# Patient Record
Sex: FEMALE | Race: WHITE | NOT HISPANIC OR LATINO | Employment: OTHER | URBAN - METROPOLITAN AREA
[De-identification: names, ages, dates, MRNs, and addresses within clinical notes are randomized per-mention and may not be internally consistent; named-entity substitution may affect disease eponyms.]

---

## 2021-08-24 DIAGNOSIS — U07.1 COVID-19: Primary | ICD-10-CM

## 2021-08-25 ENCOUNTER — INFUSION (OUTPATIENT)
Dept: INFECTIOUS DISEASES | Facility: HOSPITAL | Age: 86
End: 2021-08-25
Attending: EMERGENCY MEDICINE
Payer: MEDICARE

## 2021-08-25 VITALS
RESPIRATION RATE: 18 BRPM | SYSTOLIC BLOOD PRESSURE: 178 MMHG | TEMPERATURE: 97 F | HEART RATE: 72 BPM | OXYGEN SATURATION: 99 % | DIASTOLIC BLOOD PRESSURE: 84 MMHG

## 2021-08-25 DIAGNOSIS — U07.1 COVID-19: Primary | ICD-10-CM

## 2021-08-25 PROCEDURE — M0243 CASIRIVI AND IMDEVI INFUSION: HCPCS | Performed by: INTERNAL MEDICINE

## 2021-08-25 PROCEDURE — 25000003 PHARM REV CODE 250: Performed by: INTERNAL MEDICINE

## 2021-08-25 PROCEDURE — 63600175 PHARM REV CODE 636 W HCPCS: Performed by: INTERNAL MEDICINE

## 2021-08-25 RX ORDER — ALBUTEROL SULFATE 90 UG/1
2 AEROSOL, METERED RESPIRATORY (INHALATION)
Status: ACTIVE | OUTPATIENT
Start: 2021-08-25

## 2021-08-25 RX ORDER — SODIUM CHLORIDE 0.9 % (FLUSH) 0.9 %
10 SYRINGE (ML) INJECTION
Status: ACTIVE | OUTPATIENT
Start: 2021-08-25

## 2021-08-25 RX ORDER — DIPHENHYDRAMINE HYDROCHLORIDE 50 MG/ML
25 INJECTION INTRAMUSCULAR; INTRAVENOUS ONCE AS NEEDED
Status: ACTIVE | OUTPATIENT
Start: 2021-08-25 | End: 2033-01-21

## 2021-08-25 RX ORDER — ONDANSETRON 4 MG/1
4 TABLET, ORALLY DISINTEGRATING ORAL ONCE AS NEEDED
Status: ACTIVE | OUTPATIENT
Start: 2021-08-25 | End: 2033-01-21

## 2021-08-25 RX ORDER — ACETAMINOPHEN 325 MG/1
650 TABLET ORAL ONCE AS NEEDED
Status: ACTIVE | OUTPATIENT
Start: 2021-08-25 | End: 2033-01-21

## 2021-08-25 RX ORDER — EPINEPHRINE 0.3 MG/.3ML
0.3 INJECTION SUBCUTANEOUS
Status: ACTIVE | OUTPATIENT
Start: 2021-08-25

## 2021-08-25 RX ADMIN — CASIRIVIMAB AND IMDEVIMAB 600 MG: 600; 600 INJECTION, SOLUTION, CONCENTRATE INTRAVENOUS at 11:08

## 2023-06-28 ENCOUNTER — HOSPITAL ENCOUNTER (OUTPATIENT)
Facility: HOSPITAL | Age: 88
Discharge: HOME | End: 2023-06-29
Attending: EMERGENCY MEDICINE | Admitting: INTERNAL MEDICINE
Payer: MEDICARE

## 2023-06-28 DIAGNOSIS — E87.6 LOW BLOOD POTASSIUM: ICD-10-CM

## 2023-06-28 DIAGNOSIS — U07.1 COVID: Primary | ICD-10-CM

## 2023-06-28 DIAGNOSIS — D64.9 ANEMIA, UNSPECIFIED TYPE: ICD-10-CM

## 2023-06-28 DIAGNOSIS — R55 SYNCOPE: ICD-10-CM

## 2023-06-28 DIAGNOSIS — R55 SYNCOPE, UNSPECIFIED SYNCOPE TYPE: ICD-10-CM

## 2023-06-28 DIAGNOSIS — R07.9 CHEST PAIN: ICD-10-CM

## 2023-06-28 DIAGNOSIS — R55 NEAR SYNCOPE: ICD-10-CM

## 2023-06-28 DIAGNOSIS — R79.0 LOW MAGNESIUM LEVEL: ICD-10-CM

## 2023-06-28 PROBLEM — I10 PRIMARY HYPERTENSION: Status: ACTIVE | Noted: 2023-06-28

## 2023-06-28 PROBLEM — E83.42 HYPOMAGNESEMIA: Status: ACTIVE | Noted: 2023-06-28

## 2023-06-28 PROBLEM — I49.9 ARRHYTHMIA: Status: ACTIVE | Noted: 2023-06-28

## 2023-06-28 LAB
ALBUMIN SERPL BCP-MCNC: 2.8 G/DL (ref 3.5–5.2)
ALP SERPL-CCNC: 41 U/L (ref 55–135)
ALT SERPL W/O P-5'-P-CCNC: 7 U/L (ref 10–44)
ANION GAP SERPL CALC-SCNC: 9 MMOL/L (ref 8–16)
AST SERPL-CCNC: 16 U/L (ref 10–40)
BASOPHILS # BLD AUTO: 0.02 K/UL (ref 0–0.2)
BASOPHILS NFR BLD: 0.5 % (ref 0–1.9)
BILIRUB SERPL-MCNC: 0.7 MG/DL (ref 0.1–1)
BILIRUB UR QL STRIP: NEGATIVE
BNP SERPL-MCNC: 80 PG/ML (ref 0–99)
BUN SERPL-MCNC: 11 MG/DL (ref 8–23)
CALCIUM SERPL-MCNC: 6.6 MG/DL (ref 8.7–10.5)
CHLORIDE SERPL-SCNC: 112 MMOL/L (ref 95–110)
CK SERPL-CCNC: 66 U/L (ref 20–180)
CLARITY UR: CLEAR
CO2 SERPL-SCNC: 19 MMOL/L (ref 23–29)
COLOR UR: YELLOW
CREAT SERPL-MCNC: 0.6 MG/DL (ref 0.5–1.4)
CRP SERPL-MCNC: 12.2 MG/L (ref 0–8.2)
D DIMER PPP IA.FEU-MCNC: 0.83 MG/L FEU
DIFFERENTIAL METHOD: ABNORMAL
EOSINOPHIL # BLD AUTO: 0 K/UL (ref 0–0.5)
EOSINOPHIL NFR BLD: 0.2 % (ref 0–8)
ERYTHROCYTE [DISTWIDTH] IN BLOOD BY AUTOMATED COUNT: 12.4 % (ref 11.5–14.5)
EST. GFR  (NO RACE VARIABLE): >60 ML/MIN/1.73 M^2
FERRITIN SERPL-MCNC: 119 NG/ML (ref 20–300)
GLUCOSE SERPL-MCNC: 79 MG/DL (ref 70–110)
GLUCOSE UR QL STRIP: NEGATIVE
HCT VFR BLD AUTO: 28 % (ref 37–48.5)
HGB BLD-MCNC: 9.4 G/DL (ref 12–16)
HGB UR QL STRIP: NEGATIVE
IMM GRANULOCYTES # BLD AUTO: 0.01 K/UL (ref 0–0.04)
IMM GRANULOCYTES NFR BLD AUTO: 0.2 % (ref 0–0.5)
KETONES UR QL STRIP: NEGATIVE
LACTATE SERPL-SCNC: 1 MMOL/L (ref 0.5–2.2)
LDH SERPL L TO P-CCNC: 214 U/L (ref 110–260)
LEUKOCYTE ESTERASE UR QL STRIP: NEGATIVE
LYMPHOCYTES # BLD AUTO: 1 K/UL (ref 1–4.8)
LYMPHOCYTES NFR BLD: 24.2 % (ref 18–48)
MAGNESIUM SERPL-MCNC: 1.3 MG/DL (ref 1.6–2.6)
MCH RBC QN AUTO: 31.6 PG (ref 27–31)
MCHC RBC AUTO-ENTMCNC: 33.6 G/DL (ref 32–36)
MCV RBC AUTO: 94 FL (ref 82–98)
MONOCYTES # BLD AUTO: 0.6 K/UL (ref 0.3–1)
MONOCYTES NFR BLD: 14.9 % (ref 4–15)
NEUTROPHILS # BLD AUTO: 2.6 K/UL (ref 1.8–7.7)
NEUTROPHILS NFR BLD: 60 % (ref 38–73)
NITRITE UR QL STRIP: NEGATIVE
NRBC BLD-RTO: 0 /100 WBC
PH UR STRIP: 7 [PH] (ref 5–8)
PLATELET # BLD AUTO: 137 K/UL (ref 150–450)
PLATELET BLD QL SMEAR: ABNORMAL
PMV BLD AUTO: 9.9 FL (ref 9.2–12.9)
POTASSIUM SERPL-SCNC: 2.9 MMOL/L (ref 3.5–5.1)
PROCALCITONIN SERPL IA-MCNC: 0.02 NG/ML
PROT SERPL-MCNC: 5.1 G/DL (ref 6–8.4)
PROT UR QL STRIP: NEGATIVE
RBC # BLD AUTO: 2.97 M/UL (ref 4–5.4)
SARS-COV-2 RDRP RESP QL NAA+PROBE: POSITIVE
SODIUM SERPL-SCNC: 140 MMOL/L (ref 136–145)
SP GR UR STRIP: 1.01 (ref 1–1.03)
TROPONIN I SERPL DL<=0.01 NG/ML-MCNC: 0.02 NG/ML (ref 0–0.03)
TROPONIN I SERPL DL<=0.01 NG/ML-MCNC: <0.006 NG/ML (ref 0–0.03)
URN SPEC COLLECT METH UR: NORMAL
UROBILINOGEN UR STRIP-ACNC: NEGATIVE EU/DL
WBC # BLD AUTO: 4.29 K/UL (ref 3.9–12.7)

## 2023-06-28 PROCEDURE — 36415 COLL VENOUS BLD VENIPUNCTURE: CPT | Performed by: INTERNAL MEDICINE

## 2023-06-28 PROCEDURE — 96365 THER/PROPH/DIAG IV INF INIT: CPT

## 2023-06-28 PROCEDURE — 99291 CRITICAL CARE FIRST HOUR: CPT | Mod: 25

## 2023-06-28 PROCEDURE — 96366 THER/PROPH/DIAG IV INF ADDON: CPT

## 2023-06-28 PROCEDURE — 96368 THER/DIAG CONCURRENT INF: CPT

## 2023-06-28 PROCEDURE — 25000003 PHARM REV CODE 250: Performed by: INTERNAL MEDICINE

## 2023-06-28 PROCEDURE — 85379 FIBRIN DEGRADATION QUANT: CPT | Performed by: EMERGENCY MEDICINE

## 2023-06-28 PROCEDURE — 82550 ASSAY OF CK (CPK): CPT | Performed by: EMERGENCY MEDICINE

## 2023-06-28 PROCEDURE — G0378 HOSPITAL OBSERVATION PER HR: HCPCS

## 2023-06-28 PROCEDURE — 84484 ASSAY OF TROPONIN QUANT: CPT | Performed by: EMERGENCY MEDICINE

## 2023-06-28 PROCEDURE — 83880 ASSAY OF NATRIURETIC PEPTIDE: CPT | Performed by: EMERGENCY MEDICINE

## 2023-06-28 PROCEDURE — 93010 ELECTROCARDIOGRAM REPORT: CPT | Mod: ,,, | Performed by: INTERNAL MEDICINE

## 2023-06-28 PROCEDURE — 86140 C-REACTIVE PROTEIN: CPT | Performed by: EMERGENCY MEDICINE

## 2023-06-28 PROCEDURE — 81003 URINALYSIS AUTO W/O SCOPE: CPT | Performed by: INTERNAL MEDICINE

## 2023-06-28 PROCEDURE — 84484 ASSAY OF TROPONIN QUANT: CPT | Mod: 91 | Performed by: INTERNAL MEDICINE

## 2023-06-28 PROCEDURE — 82728 ASSAY OF FERRITIN: CPT | Performed by: EMERGENCY MEDICINE

## 2023-06-28 PROCEDURE — 25000003 PHARM REV CODE 250: Performed by: EMERGENCY MEDICINE

## 2023-06-28 PROCEDURE — U0002 COVID-19 LAB TEST NON-CDC: HCPCS | Performed by: EMERGENCY MEDICINE

## 2023-06-28 PROCEDURE — 80053 COMPREHEN METABOLIC PANEL: CPT | Performed by: EMERGENCY MEDICINE

## 2023-06-28 PROCEDURE — 83615 LACTATE (LD) (LDH) ENZYME: CPT | Performed by: EMERGENCY MEDICINE

## 2023-06-28 PROCEDURE — 84145 PROCALCITONIN (PCT): CPT | Performed by: EMERGENCY MEDICINE

## 2023-06-28 PROCEDURE — 96372 THER/PROPH/DIAG INJ SC/IM: CPT | Mod: 59 | Performed by: INTERNAL MEDICINE

## 2023-06-28 PROCEDURE — 93005 ELECTROCARDIOGRAM TRACING: CPT

## 2023-06-28 PROCEDURE — 93010 EKG 12-LEAD: ICD-10-PCS | Mod: ,,, | Performed by: INTERNAL MEDICINE

## 2023-06-28 PROCEDURE — 83735 ASSAY OF MAGNESIUM: CPT | Performed by: EMERGENCY MEDICINE

## 2023-06-28 PROCEDURE — 85025 COMPLETE CBC W/AUTO DIFF WBC: CPT | Performed by: EMERGENCY MEDICINE

## 2023-06-28 PROCEDURE — 83605 ASSAY OF LACTIC ACID: CPT | Performed by: EMERGENCY MEDICINE

## 2023-06-28 PROCEDURE — 96375 TX/PRO/DX INJ NEW DRUG ADDON: CPT

## 2023-06-28 PROCEDURE — 63600175 PHARM REV CODE 636 W HCPCS: Performed by: INTERNAL MEDICINE

## 2023-06-28 PROCEDURE — 63600175 PHARM REV CODE 636 W HCPCS: Performed by: EMERGENCY MEDICINE

## 2023-06-28 RX ORDER — ONDANSETRON 2 MG/ML
4 INJECTION INTRAMUSCULAR; INTRAVENOUS EVERY 6 HOURS PRN
Status: DISCONTINUED | OUTPATIENT
Start: 2023-06-28 | End: 2023-06-29 | Stop reason: HOSPADM

## 2023-06-28 RX ORDER — LOSARTAN POTASSIUM 50 MG/1
100 TABLET ORAL DAILY
Status: DISCONTINUED | OUTPATIENT
Start: 2023-06-29 | End: 2023-06-29 | Stop reason: HOSPADM

## 2023-06-28 RX ORDER — NALOXONE HCL 0.4 MG/ML
0.4 VIAL (ML) INJECTION
Status: DISCONTINUED | OUTPATIENT
Start: 2023-06-28 | End: 2023-06-29 | Stop reason: HOSPADM

## 2023-06-28 RX ORDER — IBUPROFEN 200 MG
24 TABLET ORAL
Status: DISCONTINUED | OUTPATIENT
Start: 2023-06-28 | End: 2023-06-29 | Stop reason: HOSPADM

## 2023-06-28 RX ORDER — ASPIRIN 81 MG/1
81 TABLET ORAL DAILY
Status: DISCONTINUED | OUTPATIENT
Start: 2023-06-29 | End: 2023-06-29 | Stop reason: HOSPADM

## 2023-06-28 RX ORDER — HYDROCHLOROTHIAZIDE 12.5 MG/1
25 TABLET ORAL DAILY
COMMUNITY
End: 2024-02-22 | Stop reason: SDUPTHER

## 2023-06-28 RX ORDER — POTASSIUM CHLORIDE 20 MEQ/1
40 TABLET, EXTENDED RELEASE ORAL ONCE
Status: COMPLETED | OUTPATIENT
Start: 2023-06-28 | End: 2023-06-28

## 2023-06-28 RX ORDER — TELMISARTAN 40 MG/1
40 TABLET ORAL
COMMUNITY
Start: 2023-06-03

## 2023-06-28 RX ORDER — FLUTICASONE PROPIONATE 50 UG/1
POWDER, METERED RESPIRATORY (INHALATION)
COMMUNITY

## 2023-06-28 RX ORDER — IBUPROFEN 200 MG
16 TABLET ORAL
Status: DISCONTINUED | OUTPATIENT
Start: 2023-06-28 | End: 2023-06-29 | Stop reason: HOSPADM

## 2023-06-28 RX ORDER — CARVEDILOL 12.5 MG/1
12.5 TABLET ORAL 2 TIMES DAILY
COMMUNITY
Start: 2023-05-08 | End: 2023-08-16 | Stop reason: SDUPTHER

## 2023-06-28 RX ORDER — ASPIRIN 81 MG/1
81 TABLET ORAL
COMMUNITY

## 2023-06-28 RX ORDER — SODIUM CHLORIDE 0.9 % (FLUSH) 0.9 %
10 SYRINGE (ML) INJECTION EVERY 12 HOURS PRN
Status: DISCONTINUED | OUTPATIENT
Start: 2023-06-28 | End: 2023-06-29 | Stop reason: HOSPADM

## 2023-06-28 RX ORDER — MAGNESIUM SULFATE HEPTAHYDRATE 40 MG/ML
2 INJECTION, SOLUTION INTRAVENOUS ONCE
Status: COMPLETED | OUTPATIENT
Start: 2023-06-28 | End: 2023-06-28

## 2023-06-28 RX ORDER — MAG HYDROX/ALUMINUM HYD/SIMETH 200-200-20
30 SUSPENSION, ORAL (FINAL DOSE FORM) ORAL 4 TIMES DAILY PRN
Status: DISCONTINUED | OUTPATIENT
Start: 2023-06-28 | End: 2023-06-29 | Stop reason: HOSPADM

## 2023-06-28 RX ORDER — ENOXAPARIN SODIUM 100 MG/ML
1 INJECTION SUBCUTANEOUS EVERY 12 HOURS
Status: DISCONTINUED | OUTPATIENT
Start: 2023-06-28 | End: 2023-06-29 | Stop reason: HOSPADM

## 2023-06-28 RX ORDER — ALBUTEROL SULFATE 90 UG/1
2 AEROSOL, METERED RESPIRATORY (INHALATION) EVERY 4 HOURS PRN
Status: DISCONTINUED | OUTPATIENT
Start: 2023-06-28 | End: 2023-06-29 | Stop reason: HOSPADM

## 2023-06-28 RX ORDER — SIMETHICONE 80 MG
1 TABLET,CHEWABLE ORAL 4 TIMES DAILY PRN
Status: DISCONTINUED | OUTPATIENT
Start: 2023-06-28 | End: 2023-06-29 | Stop reason: HOSPADM

## 2023-06-28 RX ORDER — HYDRALAZINE HYDROCHLORIDE 20 MG/ML
10 INJECTION INTRAMUSCULAR; INTRAVENOUS EVERY 6 HOURS PRN
Status: DISCONTINUED | OUTPATIENT
Start: 2023-06-28 | End: 2023-06-29 | Stop reason: HOSPADM

## 2023-06-28 RX ORDER — GLUCAGON 1 MG
1 KIT INJECTION
Status: DISCONTINUED | OUTPATIENT
Start: 2023-06-28 | End: 2023-06-29 | Stop reason: HOSPADM

## 2023-06-28 RX ORDER — ACETAMINOPHEN 325 MG/1
650 TABLET ORAL EVERY 4 HOURS PRN
Status: DISCONTINUED | OUTPATIENT
Start: 2023-06-28 | End: 2023-06-29 | Stop reason: HOSPADM

## 2023-06-28 RX ORDER — POTASSIUM CHLORIDE 7.45 MG/ML
10 INJECTION INTRAVENOUS
Status: COMPLETED | OUTPATIENT
Start: 2023-06-28 | End: 2023-06-28

## 2023-06-28 RX ADMIN — POTASSIUM CHLORIDE 10 MEQ: 7.46 INJECTION, SOLUTION INTRAVENOUS at 05:06

## 2023-06-28 RX ADMIN — HYDRALAZINE HYDROCHLORIDE 10 MG: 20 INJECTION, SOLUTION INTRAMUSCULAR; INTRAVENOUS at 07:06

## 2023-06-28 RX ADMIN — DEXAMETHASONE 6 MG: 4 TABLET ORAL at 07:06

## 2023-06-28 RX ADMIN — POTASSIUM CHLORIDE 40 MEQ: 1500 TABLET, EXTENDED RELEASE ORAL at 07:06

## 2023-06-28 RX ADMIN — MAGNESIUM SULFATE HEPTAHYDRATE 2 G: 40 INJECTION, SOLUTION INTRAVENOUS at 05:06

## 2023-06-28 RX ADMIN — ENOXAPARIN SODIUM 60 MG: 60 INJECTION SUBCUTANEOUS at 09:06

## 2023-06-28 RX ADMIN — SODIUM CHLORIDE 500 ML: 9 INJECTION, SOLUTION INTRAVENOUS at 05:06

## 2023-06-28 NOTE — ED PROVIDER NOTES
Emergency Medicine Provider Note - 6/28/2023    SCRIBE #1 NOTE: I, Melanie Clarke, am scribing for, and in the presence of,  Sofia Lopes DO. I have scribed the entire note.      History     Chief Complaint   Patient presents with    Loss of Consciousness     Pt reports multiple syncopal episodes x 2 in the past two days. Pt tested COVID positive yesterday. No reported pain          History of Present Illness   HPI    6/28/2023, 12:46 PM    The history is provided by the patient and RO Keys is a 88 y.o. female presenting to the ED for an evaluation because she had a 5-minute episode of LOC PTA. Per RO, yesterday, the pt had a syncopal episode and was dx with COVID-19. Pt hit her head during the syncopal episode. Then, today, the pt's family noticed that the pt lost consciousness for 5 minutes in her recliner after she had phenergan cough syrup. When RO arrived on scene, the pt's blood pressure was in the 150s systolic. Symptoms are episodic and moderate in severity. No mitigating or exacerbating factors reported. No associated sxs reported. Patient denies any fever, chills, CP, SOB, cough, and all other sxs at this time.    Patient's daughter Jani are at the bedside.  They state that the patient was in a recliner sitting.  They state about 15-20 minutes after taking a tsp of Phenergan without codeine, patient started having decreasing levels of consciousness.  She appeared pale.  The daughter was in able to feel a pulse.  They called 911.  They lower the head of the recliner.  They attempted to put a blood pressure cuff on her.  The automatic blood pressure cuff would not read.  It took multiple attempts before reading could be obtained.  They noticed systolic blood pressure of 70s.  No seizure activity.  They note that a similar episode happened the previous day.  She was standing at the sink cutting pairs.  She had put her head down.  A family member then helped her sit down.    .    Patient states that she has had the urge to have bowel movements.  They are not exactly loose, but not solid either.  She reports waxing and waning nausea. No further complaints or concerns at this time.         Arrival mode:  AASI      PCP: Primary Doctor No     Allergies:  Review of patient's allergies indicates:   Allergen Reactions    Iodine and iodide containing products Itching and Swelling       Past Medical History:  Past Medical History:   Diagnosis Date    Arthritis     Hypertension        Past Surgical History:  Past Surgical History:   Procedure Laterality Date    APPENDECTOMY      COLPOSCOPY, WITH BIOPSY OF CERVIX      EYE SURGERY           Family History:  Family History   Problem Relation Age of Onset    Heart disease Brother        Social History:  Social History     Tobacco Use    Smoking status: Never    Smokeless tobacco: Never   Substance and Sexual Activity    Alcohol use: Not on file    Drug use: Not on file    Sexual activity: Not on file       Triage note, Allergies, Past Medical History, Past Surgical History, Family History and Social History reviewed as documented above.     Review of Systems   Review of Systems   Constitutional:  Negative for chills and fever.   HENT:  Negative for sore throat.    Respiratory:  Negative for cough and shortness of breath.    Cardiovascular:  Negative for chest pain.   Gastrointestinal:  Positive for nausea. Negative for vomiting.   Genitourinary:  Negative for dysuria.   Musculoskeletal:  Negative for back pain.   Skin:  Positive for pallor. Negative for rash.   Neurological:  Positive for syncope.   Hematological:  Does not bruise/bleed easily.   All other systems reviewed and are negative.       Physical Exam     Initial Vitals [06/28/23 1228]   BP Pulse Resp Temp SpO2   127/65 67 16 98 °F (36.7 °C) 98 %      MAP       --          Physical Exam    Nursing Notes and Vital Signs Reviewed.  Constitutional: Patient is in no acute distress.  Thin  appearing  Head: Atraumatic. Normocephalic.  Eyes: PERRL. EOM intact. Conjunctivae are not pale. No scleral icterus.  ENT: Mucous membranes are moist. Oropharynx is clear and symmetric.    Neck: Supple. Full ROM. No lymphadenopathy.  Cardiovascular: Regular rate. Regular rhythm. No murmurs, rubs, or gallops. Distal pulses are 2+ and symmetric.  Pulmonary/Chest: No respiratory distress. Clear to auscultation bilaterally. No wheezing or rales.  Abdominal: Soft and non-distended.  There is no tenderness.  No rebound, guarding, or rigidity.   Musculoskeletal: Moves all extremities. No obvious deformities. No edema.  Skin: Warm and dry.  Neurological:  Alert, awake, and appropriate.  Normal speech. Cranial nerves II-XII intact. No acute focal neurological deficits are appreciated.  GCS 15.   Psychiatric: Normal affect. Good eye contact. Appropriate in content.     ED Course     ED Procedures:  Critical Care    Date/Time: 6/28/2023 12:46 PM  Performed by: Sofia Lopes DO  Authorized by: Sofia Lopes DO   Direct patient critical care time: 15 minutes  Additional history critical care time: 7 minutes  Ordering / reviewing critical care time: 6 minutes  Documentation critical care time: 7 minutes  Consulting other physicians critical care time: 9 minutes  Total critical care time (exclusive of procedural time) : 44 minutes  Critical care time was exclusive of separately billable procedures and treating other patients and teaching time.  Critical care was necessary to treat or prevent imminent or life-threatening deterioration of the following conditions: dehydration (electrolyte abnormality).  Critical care was time spent personally by me on the following activities: blood draw for specimens, development of treatment plan with patient or surrogate, discussions with consultants, interpretation of cardiac output measurements, evaluation of patient's response to treatment, examination of patient, obtaining history  "from patient or surrogate, ordering and performing treatments and interventions, ordering and review of laboratory studies, ordering and review of radiographic studies, review of old charts, re-evaluation of patient's condition and pulse oximetry.        ED Vital Signs:  Vitals:    06/28/23 1228 06/28/23 1304 06/28/23 1440 06/28/23 1442   BP: 127/65  (!) 148/79 (!) 167/72   Pulse: 67  60 64   Resp: 16      Temp: 98 °F (36.7 °C)      TempSrc: Oral      SpO2: 98%      Weight:  64.7 kg (142 lb 10.2 oz)     Height: 5' 1" (1.549 m)       06/28/23 1444 06/28/23 1933 06/28/23 1942 06/28/23 2000   BP: (!) 160/77 (!) 188/83  (!) 179/79   Pulse: 62  63 68   Resp:    (!) 23   Temp:       TempSrc:       SpO2:    98%   Weight:       Height:        06/28/23 2100   BP: (!) 169/83   Pulse: 68   Resp: (!) 23   Temp:    TempSrc:    SpO2: 99%   Weight:    Height:        Abnormal Lab Results:  Labs Reviewed   SARS-COV-2 RNA AMPLIFICATION, QUAL - Abnormal; Notable for the following components:       Result Value    SARS-CoV-2 RNA, Amplification, Qual Positive (*)     All other components within normal limits   CBC W/ AUTO DIFFERENTIAL - Abnormal; Notable for the following components:    RBC 2.97 (*)     Hemoglobin 9.4 (*)     Hematocrit 28.0 (*)     MCH 31.6 (*)     Platelets 137 (*)     Platelet Estimate Clumped (*)     All other components within normal limits   COMPREHENSIVE METABOLIC PANEL - Abnormal; Notable for the following components:    Potassium 2.9 (*)     Chloride 112 (*)     CO2 19 (*)     Calcium 6.6 (*)     Total Protein 5.1 (*)     Albumin 2.8 (*)     Alkaline Phosphatase 41 (*)     ALT 7 (*)     All other components within normal limits    Narrative:     ca critical result(s) called and verbal readback obtained from najma childs rn by JCS5 06/28/2023 15:09   C-REACTIVE PROTEIN - Abnormal; Notable for the following components:    CRP 12.2 (*)     All other components within normal limits   D DIMER, QUANTITATIVE - " Abnormal; Notable for the following components:    D-Dimer 0.83 (*)     All other components within normal limits   MAGNESIUM - Abnormal; Notable for the following components:    Magnesium 1.3 (*)     All other components within normal limits   FERRITIN   LACTATE DEHYDROGENASE   CK   LACTIC ACID, PLASMA   TROPONIN I   PROCALCITONIN   B-TYPE NATRIURETIC PEPTIDE   MAGNESIUM   URINALYSIS, REFLEX TO URINE CULTURE    Narrative:     Specimen Source->Urine   TROPONIN I   TROPONIN I   SARS-COV-2 RDRP GENE    [x] abnormal labs reviewed    All Lab Results:  Results for orders placed or performed during the hospital encounter of 06/28/23   COVID-19 Rapid Screening   Result Value Ref Range    SARS-CoV-2 RNA, Amplification, Qual Positive (A) Negative   CBC auto differential   Result Value Ref Range    WBC 4.29 3.90 - 12.70 K/uL    RBC 2.97 (L) 4.00 - 5.40 M/uL    Hemoglobin 9.4 (L) 12.0 - 16.0 g/dL    Hematocrit 28.0 (L) 37.0 - 48.5 %    MCV 94 82 - 98 fL    MCH 31.6 (H) 27.0 - 31.0 pg    MCHC 33.6 32.0 - 36.0 g/dL    RDW 12.4 11.5 - 14.5 %    Platelets 137 (L) 150 - 450 K/uL    MPV 9.9 9.2 - 12.9 fL    Immature Granulocytes 0.2 0.0 - 0.5 %    Gran # (ANC) 2.6 1.8 - 7.7 K/uL    Immature Grans (Abs) 0.01 0.00 - 0.04 K/uL    Lymph # 1.0 1.0 - 4.8 K/uL    Mono # 0.6 0.3 - 1.0 K/uL    Eos # 0.0 0.0 - 0.5 K/uL    Baso # 0.02 0.00 - 0.20 K/uL    nRBC 0 0 /100 WBC    Gran % 60.0 38.0 - 73.0 %    Lymph % 24.2 18.0 - 48.0 %    Mono % 14.9 4.0 - 15.0 %    Eosinophil % 0.2 0.0 - 8.0 %    Basophil % 0.5 0.0 - 1.9 %    Platelet Estimate Clumped (A)     Differential Method Automated    Comprehensive metabolic panel   Result Value Ref Range    Sodium 140 136 - 145 mmol/L    Potassium 2.9 (L) 3.5 - 5.1 mmol/L    Chloride 112 (H) 95 - 110 mmol/L    CO2 19 (L) 23 - 29 mmol/L    Glucose 79 70 - 110 mg/dL    BUN 11 8 - 23 mg/dL    Creatinine 0.6 0.5 - 1.4 mg/dL    Calcium 6.6 (LL) 8.7 - 10.5 mg/dL    Total Protein 5.1 (L) 6.0 - 8.4 g/dL    Albumin  2.8 (L) 3.5 - 5.2 g/dL    Total Bilirubin 0.7 0.1 - 1.0 mg/dL    Alkaline Phosphatase 41 (L) 55 - 135 U/L    AST 16 10 - 40 U/L    ALT 7 (L) 10 - 44 U/L    eGFR >60 >60 mL/min/1.73 m^2    Anion Gap 9 8 - 16 mmol/L   C-Reactive Protein   Result Value Ref Range    CRP 12.2 (H) 0.0 - 8.2 mg/L   Ferritin   Result Value Ref Range    Ferritin 119 20.0 - 300.0 ng/mL   Lactate Dehydrogenase   Result Value Ref Range     110 - 260 U/L   CK   Result Value Ref Range    CPK 66 20 - 180 U/L   Lactic Acid, Plasma   Result Value Ref Range    Lactate (Lactic Acid) 1.0 0.5 - 2.2 mmol/L   Troponin I   Result Value Ref Range    Troponin I <0.006 0.000 - 0.026 ng/mL   Procalcitonin   Result Value Ref Range    Procalcitonin 0.02 <0.25 ng/mL   Brain Natriuretic Peptide   Result Value Ref Range    BNP 80 0 - 99 pg/mL   D-Dimer, Quantitative   Result Value Ref Range    D-Dimer 0.83 (H) <0.50 mg/L FEU   Magnesium   Result Value Ref Range    Magnesium 1.3 (L) 1.6 - 2.6 mg/dL   Urinalysis, Reflex to Urine Culture Urine, Clean Catch    Specimen: Urine   Result Value Ref Range    Specimen UA Urine, Clean Catch     Color, UA Yellow Yellow, Straw, Anamika    Appearance, UA Clear Clear    pH, UA 7.0 5.0 - 8.0    Specific Gravity, UA 1.015 1.005 - 1.030    Protein, UA Negative Negative    Glucose, UA Negative Negative    Ketones, UA Negative Negative    Bilirubin (UA) Negative Negative    Occult Blood UA Negative Negative    Nitrite, UA Negative Negative    Urobilinogen, UA Negative <2.0 EU/dL    Leukocytes, UA Negative Negative   Troponin I   Result Value Ref Range    Troponin I 0.018 0.000 - 0.026 ng/mL       4 months ago.  Hemoglobin 12.0 - 16.0 g/dL 12.7    Hematocrit 37.0 - 47.0 % 39.3            ECG Results              EKG 12-lead (Final result)  Result time 06/28/23 20:35:14      Final result by Interface, Lab In [facility] (06/28/23 20:35:14)                   Narrative:    Test Reason : R55,    Vent. Rate : 059 BPM     Atrial Rate : 059  BPM     P-R Int : 284 ms          QRS Dur : 110 ms      QT Int : 458 ms       P-R-T Axes : 070 -53 086 degrees     QTc Int : 453 ms    Sinus bradycardia with 1st degree A-V block  Left axis deviation  Minimal voltage criteria for LVH, may be normal variant ( Hurricane product )  Inferior infarct ,age undetermined  Anterolateral infarct ,age undetermined  Abnormal ECG  No previous ECGs available  Confirmed by LISA FRENCH MD ([id number]) on 6/28/2023 8:35:03 PM    Referred By: [email or web address]   SELF           Confirmed By:LISA FRENCH MD                      Wet Read by Sofia Lopes DO (06/28/23 14:48:17, [facility] Emergency Dept[facility] Emergency Medicine)    Rate of 59 beats per minute.  Sinus bradycardia.  First-degree AV block.  Left axis deviation.  No ST segment elevation.  Q-waves in V1, V2, V3.  No old EKGs to compare to.                                    Imaging Results:  Imaging Results              US Lower Extremity Veins Bilateral (Final result)  Result time 06/28/23 17:27:46      Final result by Freddy Crump MD (06/28/23 17:27:46)                   Impression:      No evidence of deep venous thrombosis in either lower extremity.      Electronically signed by: Freddy Crump  Date:    06/28/2023  Time:    17:27               Narrative:    EXAMINATION:  US LOWER EXTREMITY VEINS BILATERAL    CLINICAL HISTORY:  rule out DVT;    TECHNIQUE:  Duplex and color flow Doppler and dynamic compression was performed of the bilateral lower extremity veins was performed.    COMPARISON:  None    FINDINGS:  Right thigh veins: The common femoral, femoral, popliteal, upper greater saphenous, and deep femoral veins are patent and free of thrombus. The veins are normally compressible and have normal phasic flow and augmentation response.    Right calf veins: The visualized calf veins are patent.    Left thigh veins: The common femoral, femoral, popliteal, upper greater saphenous, and deep femoral veins are patent and free of thrombus.  The veins are normally compressible and have normal phasic flow and augmentation response.    Left calf veins: The visualized calf veins are patent.    Miscellaneous: None                                       CT Head Without Contrast (Final result)  Result time 06/28/23 17:03:03      Final result by Freddy Crump MD (06/28/23 17:03:03)                   Impression:      No acute intracranial CT abnormality.  Correlation and further evaluation as warranted.    All CT scans at this facility are performed  using dose modulation techniques as appropriate to performed exam including the following:  automated exposure control; adjustment of mA and/or kV according to the patients size (this includes techniques or standardized protocols for targeted exams where dose is matched to indication/reason for exam: i.e. extremities or head);  iterative reconstruction technique.      Electronically signed by: Freddy Crump  Date:    06/28/2023  Time:    17:03               Narrative:    EXAMINATION:  CT HEAD WITHOUT CONTRAST    CLINICAL HISTORY:  Syncope, recurrent; Syncope and collapse    TECHNIQUE:  Low dose axial CT images obtained throughout the head without intravenous contrast. Sagittal and coronal reconstructions were performed.    COMPARISON:  None.    FINDINGS:  Intracranial compartment:    Ventricles and sulci are normal in size for age without evidence of hydrocephalus. No extra-axial blood or fluid collections.    Moderate microvascular ischemic change.  No parenchymal mass, hemorrhage, edema or major vascular distribution infarct.    Skull/extracranial contents (limited evaluation): No fracture. Mastoid air cells and paranasal sinuses are essentially clear.  Temporomandibular joint degenerative changes.                                       X-Ray Chest AP Portable (Final result)  Result time 06/28/23 14:04:45      Final result by ARIS Banda Sr., MD (06/28/23 14:04:45)                   Impression:      1.  There is no focal pulmonary infiltrate visualized.  2. There are sclerotic changes in the posterior aspect of the left 5th and 6th ribs.  This is characteristic of healing and/or healed fractures.  3. The size of the heart is prominent.  This may be secondary to magnification.  .      Electronically signed by: Thomas Banda MD  Date:    06/28/2023  Time:    14:04               Narrative:    EXAMINATION:  XR CHEST AP PORTABLE    CLINICAL HISTORY:  COVID-19;    COMPARISON:  None    FINDINGS:  The size of the heart is prominent.  There is no focal pulmonary infiltrate visualized.  There are sclerotic changes in the posterior aspect of the left 5th and 6th ribs.  There is no pneumothorax.  The costophrenic angles are sharp.                                            The Emergency Provider reviewed the vital signs and test results, which are outlined above.     ED Discussion     ED Course as of 06/28/23 2215 Wed Jun 28, 2023   1415 Independent review of chest x-ray:  Heart size is prominent.  No infiltrate. [LB]   1514 Potassium(!): 2.9 [LB]   1514 Chloride(!): 112 [LB]   1514 CO2(!): 19 [LB]   1514 Calcium(!!): 6.6 [LB]   1514 PROTEIN TOTAL(!): 5.1 [LB]   1514 Albumin(!): 2.8 [LB]      ED Course User Index  [LB] Sofia Lopes DO       3:26 PM: Discussed case with Dr. Clarke (Hospital Medicine). Dr. Clarke agrees with current care and management of pt and accepts admission.   Admitting Service: Hospital Medicine  Admitting Physician: Dr. Clarke  Admit to: Obs Tele    3:30 PM: Re-evaluated pt. I have discussed test results, shared treatment plan, and the need for admission with patient and family at bedside. Pt and family express understanding at this time and agree with all information. All questions answered. Pt and family have no further questions or concerns at this time. Pt is ready for admit.        ED Medication(s):  Medications   sodium chloride 0.9% flush 10 mL (has no administration in time range)    naloxone 0.4 mg/mL injection 0.4 mg (has no administration in time range)   glucose chewable tablet 16 g (has no administration in time range)   glucose chewable tablet 24 g (has no administration in time range)   glucagon (human recombinant) injection 1 mg (has no administration in time range)   enoxaparin injection 60 mg (60 mg Subcutaneous Given 6/28/23 2122)   acetaminophen tablet 650 mg (has no administration in time range)   ondansetron injection 4 mg (has no administration in time range)   aluminum-magnesium hydroxide-simethicone 200-200-20 mg/5 mL suspension 30 mL (has no administration in time range)   simethicone chewable tablet 80 mg (has no administration in time range)   dexAMETHasone tablet 6 mg (6 mg Oral Given 6/28/23 1933)   aspirin EC tablet 81 mg (has no administration in time range)   losartan tablet 100 mg (has no administration in time range)   hydrALAZINE injection 10 mg (10 mg Intravenous Given 6/28/23 1933)   albuterol inhaler 2 puff (has no administration in time range)   sodium chloride 0.9% bolus 500 mL 500 mL (0 mLs Intravenous Stopped 6/28/23 1837)   potassium chloride 10 mEq in 100 mL IVPB (0 mEq Intravenous Stopped 6/28/23 1931)   magnesium sulfate 2g in water 50mL IVPB (premix) (0 g Intravenous Stopped 6/28/23 1934)   potassium chloride SA CR tablet 40 mEq (40 mEq Oral Given 6/28/23 1933)     New Prescriptions    No medications on file           Medical Decision Making   Medical Decision Making  Patient with recent onset fatigue.  Two near syncopal episodes.  Most raking since syncopal episode happened prior to arrival.  Patient was reclining in a chair.  Patient became unresponsive.  Blood pressure in 70s.  Diagnosed with COVID recently.      Differential diagnosis includes: Acute on chronic renal failure, electrolyte abnormality, pulmonary embolism, atypical ACS, arrhythmia      Labs notable for D-dimer of 0.83, magnesium of 1.3, potassium of 2.9.  10 mEq KCL K rider ordered.  2  g of magnesium sulfate ordered.  Patient's hemoglobin did decrease to 4.9 from 12.74 months prior.  Patient chest x-ray was clear.  No infiltrate.  Ultrasound lower extremities were negative for DVT.  Head CT negative for intracranial pathology.  As patient had electrolyte abnormalities and syncopal episode, recommendation for admission to hospital.    Amount and/or Complexity of Data Reviewed  Independent Historian: caregiver and EMS     Details: As documented in HPI.  External Data Reviewed: labs.     Details: 4 mo ago   White Blood Cell Count 4.0 - 11.0 1000/uL 5.1   Red Blood Cell Count 3.80 - 5.30 mill/uL 3.84   Hemoglobin 12.0 - 16.0 g/dL 12.7   Hematocrit 37.0 - 47.0 % 39.3   Mean Corpuscular Volume 80 - 100 fL 102 High    Mean Corpuscular Hemoglobin Conc 31.0 - 37.0 g/dL 32.2   Red Cell Distribution Width 12.1 - 14.9 % 11.0 Low    Platelet Count 150 - 375 K/uL 215   Mean Platelet Volume 6.5 - 12.0 fL 7.0   Neutrophils Abs 1.5 - 10.0 1000/UL 2.8   Lymphocytes Abs 1.3 - 2.9 1000/ul 1.8   Monocytes Abs 0.1 - 1.0 1000/ul 0.4   Eosinophils Abs 0.0 - 0.7 1000/UL 0.1   Basophils Abs 0.0 - 0.1 1000/UL 0.1   Neutrophils % 44 - 81 % 55   Lymphocytes % 21 - 47 % 35   Monocytes % 2 - 11 % 8   Eosinophils % 0 - 7 % 2   Basophils % 0 - 1 % 1   Resulting Agency  [facility]  Specimen Collected: 02/22/23 17:59 Last Resulted: 02/22/23 18:07  Received From: [facility]idi[facility] and Affiliates   Result Received: 06/  Labs: ordered. Decision-making details documented in ED Course.  Radiology: ordered and independent interpretation performed. Decision-making details documented in ED Course.  ECG/medicine tests: ordered and independent interpretation performed. Decision-making details documented in ED Course.    Risk  Drug therapy requiring intensive monitoring for toxicity.  Decision regarding hospitalization.    Critical Care  Total time providing critical care: 44  "minutes        Discussed case with:Hospital Medicine            MIPS Measures     Smoker? No     Hypertension: History of Hypertension: The patient has elevated blood pressure (higher than 120/80) while being treated in the ED but has a history of hypertension.      Portions of this note may have been created with voice recognition software. Occasional "wrong-word" or "sound-a-like" substitutions may have occurred due to the inherent limitations of voice recognition software. Please, read the note carefully and recognize, using context, where substitutions have occurred.            Clinical Impression       ICD-10-CM ICD-9-CM   1. Low blood potassium  E87.6 276.8   2. Near syncope  R55 780.2   3. Syncope  R55 780.2   4. Chest pain  R07.9 786.50   5. Low magnesium level  R79.0 790.6   6. Anemia, unspecified type  D64.9 285.9   7. COVID  U07.1 079.89         ED Disposition   Disposition: Obs telemetry  Patient condition: Fair        Scribe Attestation:   Scribe #1: I performed the above scribed service and the documentation accurately describes the services I performed. I attest to the accuracy of the note.    Attending Attestation:           Physician Attestation for Scribe:  Physician Attestation Statement for Scribe #1: I, Sofia Lopes, , reviewed documentation, as scribed by Melanie Clarke in my presence, and it is both accurate and complete.              Sofia Lpoes DO  06/28/23 [id number]    "

## 2023-06-28 NOTE — H&P
[facility] - Emergency Dept.  [facility] Medicine  History & Physical    Patient Name: Jayshree Keys  MRN: [id number]  Patient Class: OP- Observation  Admission Date: 6/28/2023  Attending Physician: Makenzie Clarke MD   Primary Care Provider: Primary Doctor No         Patient information was obtained from patient, relative(s), past medical records and ER records.     Subjective:     Principal Problem:Syncope    Chief Complaint:   Chief Complaint   Patient presents with    Loss of Consciousness     Pt reports multiple syncopal episodes x 2 in the past two days. Pt tested COVID positive yesterday. No reported pain        HPI: The patient is an 89 yo female with past medical history of arrhythmia, hypertension, and arthritis who presented to the ED after syncopal episode. She tested positive for  COVID yesterday. She was exposed over the weekend. Yesterday she started having body aches and malaise. She took 200 mg of ibuprofen followed by a dose of naproxen yesterday. She had syncopal episode while peeling pears. She did not say anything about as she recovered quickly. Today she passed out while sitting in her recliner. Her daughter was unable to feel a pulse and the home BP cuff could not  a blood pressure. Her family called EMS and patient was laid back in the recliner. Patient had taken a dose of promethazine with codeine for cough shortly before episode today. Upon EMS arrival, her BP was noted to be in the 150s. Patient reports she fell in February and hit her head. She just remembers waking up on the floor bleeding. She states she initially thought she tripped but now she recalls she was standing and peeling an onion. COVID positive confirmed. Elevated Ddimer. [facility] medicine consulted. Patient placed in observation.    Patient reported knee injection 2 weeks ago. It was a 3 shot series a week apart.       Past Medical History:   Diagnosis Date    Arthritis     Hypertension        Past Surgical History:    Procedure Laterality Date    APPENDECTOMY      COLPOSCOPY, WITH BIOPSY OF CERVIX      EYE SURGERY         Review of patient's allergies indicates:   Allergen Reactions    Iodine and iodide containing products Itching and Swelling       Current Facility-Administered Medications on File Prior to Encounter   Medication    acetaminophen tablet 650 mg    albuterol inhaler 2 puff    diphenhydrAMINE injection 25 mg    EPINEPHrine (EPIPEN) 0.3 mg/0.3 mL pen injection 0.3 mg    methylPREDNISolone sodium succinate injection 40 mg    ondansetron disintegrating tablet 4 mg    sodium chloride 0.9% 500 mL flush bag    sodium chloride 0.9% flush 10 mL     Current Outpatient Medications on File Prior to Encounter   Medication Sig    aspirin (ECOTRIN) 81 MG EC tablet Take 81 mg by mouth.    carvediloL (COREG) 12.5 MG tablet Take 12.5 mg by mouth 2 (two) times daily.    fluticasone propionate (FLOVENT DISKUS) 50 mcg/actuation DsDv Inhale into the lungs. Controller    hydroCHLOROthiazide (HYDRODIURIL) 12.5 MG Tab Take 25 mg by mouth once daily.    telmisartan (MICARDIS) 40 MG Tab Take 40 mg by mouth.     Family History       Problem Relation (Age of Onset)    Heart disease Brother          Tobacco Use    Smoking status: Never    Smokeless tobacco: Never   Substance and Sexual Activity    Alcohol use: Not on file    Drug use: Not on file    Sexual activity: Not on file     Review of Systems   Respiratory:  Positive for cough. Negative for shortness of breath.    Cardiovascular:  Negative for chest pain and palpitations.   Gastrointestinal:  Positive for nausea.   Musculoskeletal:  Positive for arthralgias and myalgias.   Neurological:  Positive for syncope.   All other systems reviewed and are negative.  Objective:     Vital Signs (Most Recent):  Temp: 98 °F (36.7 °C) (06/28/23 1228)  Pulse: 62 (06/28/23 1444)  Resp: 16 (06/28/23 1228)  BP: (!) 160/77 (06/28/23 1444)  SpO2: 98 % (06/28/23 1228) Vital Signs (24h  Range):  Temp:  [98 °F (36.7 °C)] 98 °F (36.7 °C)  Pulse:  [60-67] 62  Resp:  [16] 16  SpO2:  [98 %] 98 %  BP: (127-167)/(65-79) 160/77     Weight: 64.7 kg (142 lb 10.2 oz)  Body mass index is 26.95 kg/m².     Physical Exam  HENT:      Head: Normocephalic and atraumatic.   Cardiovascular:      Rate and Rhythm: Regular rhythm. Bradycardia present.      Heart sounds: No murmur heard.  Pulmonary:      Effort: Pulmonary effort is normal. No respiratory distress.      Breath sounds: Normal breath sounds. No wheezing.   Abdominal:      General: Bowel sounds are normal. There is no distension.      Palpations: Abdomen is soft.      Tenderness: There is no abdominal tenderness.   Musculoskeletal:         General: No swelling.   Skin:     General: Skin is warm and dry.   Neurological:      Mental Status: She is alert and oriented to person, place, and time. Mental status is at baseline.              Significant Labs: All pertinent labs within the past 24 hours have been reviewed.  CBC:   Recent Labs   Lab 06/28/23  1412   WBC 4.29   HGB 9.4*   HCT 28.0*   *     CMP:   Recent Labs   Lab 06/28/23  1412      K 2.9*   *   CO2 19*   GLU 79   BUN 11   CREATININE 0.6   CALCIUM 6.6*   PROT 5.1*   ALBUMIN 2.8*   BILITOT 0.7   ALKPHOS 41*   AST 16   ALT 7*   ANIONGAP 9     Cardiac Markers:   Recent Labs   Lab 06/28/23  1412   BNP 80     Troponin:   Recent Labs   Lab 06/28/23  1412   TROPONINI <0.006       Significant Imaging: I have reviewed all pertinent imaging results/findings within the past 24 hours.    Assessment/Plan:     * Syncope  Cardiac monitoring  Trend troponin  Obtain echo  Consult cardiology as recurrent and patient reports history of arrhythmia        Arrhythmia  Patient reports beta blocker was started due to her heart fluttering  Currently bradycardic with heart rate ranging from 54-59, occasional 60      Hypomagnesemia  Replace mag  Repeat labs in am      Hypokalemia  Replace with oral and IV  supplementation      Primary hypertension  Continue home medications  Prn IV hydralazine  Monitor blood pressure    COVID-19  Patient is identified as High risk for severe complications of COVID 19 based on COVID risk score of 3   Initiate standard COVID protocols; COVID-19 testing ,Infection Control notification  and isolation- respiratory, contact and droplet per protocol    Diagnostics: CBC, CMP, Ferritin, CRP and Portable CXR    Management: Inhaled bronchodilators as needed for shortness of breath. and Continuous cardiac monitoring.    Advance Care Planning  Current advance care plan has not been discussed with patient/family/POA and patient currently wishes Full Code.      VTE Risk Mitigation (From admission, onward)         Ordered     enoxaparin injection 60 mg  Every 12 hours         06/28/23 1814     IP VTE HIGH RISK PATIENT  Once         06/28/23 1814     Place sequential compression device  Until discontinued         06/28/23 1814                   On 06/28/2023, patient should be placed in hospital observation services under my care.        Makenzie Clarke MD  Department of Hospital Medicine  [facility] - Emergency Dept.

## 2023-06-28 NOTE — ASSESSMENT & PLAN NOTE
Patient is identified as High risk for severe complications of COVID 19 based on COVID risk score of 3   Initiate standard COVID protocols; COVID-19 testing ,Infection Control notification  and isolation- respiratory, contact and droplet per protocol    Diagnostics: CBC, CMP, Ferritin, CRP and Portable CXR    Management: Inhaled bronchodilators as needed for shortness of breath. and Continuous cardiac monitoring.    Advance Care Planning  Current advance care plan has not been discussed with patient/family/POA and patient currently wishes Full Code.

## 2023-06-28 NOTE — ED NOTES
Bed: 15  Expected date:   Expected time:   Means of arrival: Ambulance Service  Comments:  When clean

## 2023-06-28 NOTE — ASSESSMENT & PLAN NOTE
Cardiac monitoring  Trend troponin  Obtain echo  Consult cardiology as recurrent and patient reports history of arrhythmia

## 2023-06-28 NOTE — HPI
The patient is an 87 yo female with past medical history of arrhythmia, hypertension, and arthritis who presented to the ED after syncopal episode. She tested positive for  COVID yesterday. She was exposed over the weekend. Yesterday she started having body aches and malaise. She took 200 mg of ibuprofen followed by a dose of naproxen yesterday. She had syncopal episode while peeling pears. She did not say anything about as she recovered quickly. Today she passed out while sitting in her recliner. Her daughter was unable to feel a pulse and the home BP cuff could not  a blood pressure. Her family called EMS and patient was laid back in the recliner. Patient had taken a dose of promethazine with codeine for cough shortly before episode today. Upon EMS arrival, her BP was noted to be in the 150s. Patient reports she fell in February and hit her head. She just remembers waking up on the floor bleeding. She states she initially thought she tripped but now she recalls she was standing and peeling an onion. COVID positive confirmed. Elevated Ddimer. [facility] medicine consulted. Patient placed in observation.    Patient reported knee injection 2 weeks ago. It was a 3 shot series a week apart.

## 2023-06-29 VITALS
TEMPERATURE: 100 F | OXYGEN SATURATION: 95 % | SYSTOLIC BLOOD PRESSURE: 167 MMHG | RESPIRATION RATE: 18 BRPM | HEART RATE: 71 BPM | WEIGHT: 142 LBS | DIASTOLIC BLOOD PRESSURE: 69 MMHG | HEIGHT: 61 IN | BODY MASS INDEX: 26.81 KG/M2

## 2023-06-29 DIAGNOSIS — U07.1 COVID-19 VIRUS DETECTED: ICD-10-CM

## 2023-06-29 LAB
ALBUMIN SERPL BCP-MCNC: 3.6 G/DL (ref 3.5–5.2)
ALP SERPL-CCNC: 55 U/L (ref 55–135)
ALT SERPL W/O P-5'-P-CCNC: 10 U/L (ref 10–44)
ANION GAP SERPL CALC-SCNC: 12 MMOL/L (ref 8–16)
AORTIC ROOT ANNULUS: 3.3 CM
ASCENDING AORTA: 3.32 CM
AST SERPL-CCNC: 18 U/L (ref 10–40)
AV INDEX (PROSTH): 0.99
AV MEAN GRADIENT: 5 MMHG
AV PEAK GRADIENT: 8 MMHG
AV VALVE AREA: 2.7 CM2
AV VELOCITY RATIO: 0.89
BASOPHILS # BLD AUTO: 0.01 K/UL (ref 0–0.2)
BASOPHILS NFR BLD: 0.3 % (ref 0–1.9)
BILIRUB SERPL-MCNC: 0.7 MG/DL (ref 0.1–1)
BSA FOR ECHO PROCEDURE: 1.66 M2
BUN SERPL-MCNC: 13 MG/DL (ref 8–23)
CALCIUM SERPL-MCNC: 9.1 MG/DL (ref 8.7–10.5)
CHLORIDE SERPL-SCNC: 103 MMOL/L (ref 95–110)
CO2 SERPL-SCNC: 23 MMOL/L (ref 23–29)
CREAT SERPL-MCNC: 0.7 MG/DL (ref 0.5–1.4)
CV ECHO LV RWT: 0.51 CM
DIFFERENTIAL METHOD: ABNORMAL
DOP CALC AO PEAK VEL: 1.41 M/S
DOP CALC AO VTI: 27.5 CM
DOP CALC LVOT AREA: 2.7 CM2
DOP CALC LVOT DIAMETER: 1.86 CM
DOP CALC LVOT PEAK VEL: 1.26 M/S
DOP CALC LVOT STROKE VOLUME: 74.14 CM3
DOP CALC RVOT PEAK VEL: 0.73 M/S
DOP CALC RVOT VTI: 16.7 CM
DOP CALCLVOT PEAK VEL VTI: 27.3 CM
E/A RATIO: 0
E/E' RATIO: 0 M/S
ECHO LV POSTERIOR WALL: 0.96 CM (ref 0.6–1.1)
EJECTION FRACTION: 65 %
EOSINOPHIL # BLD AUTO: 0 K/UL (ref 0–0.5)
EOSINOPHIL NFR BLD: 0 % (ref 0–8)
ERYTHROCYTE [DISTWIDTH] IN BLOOD BY AUTOMATED COUNT: 12.2 % (ref 11.5–14.5)
EST. GFR  (NO RACE VARIABLE): >60 ML/MIN/1.73 M^2
FRACTIONAL SHORTENING: 31 % (ref 28–44)
GLUCOSE SERPL-MCNC: 135 MG/DL (ref 70–110)
HCT VFR BLD AUTO: 35.1 % (ref 37–48.5)
HGB BLD-MCNC: 12.2 G/DL (ref 12–16)
IMM GRANULOCYTES # BLD AUTO: 0.01 K/UL (ref 0–0.04)
IMM GRANULOCYTES NFR BLD AUTO: 0.3 % (ref 0–0.5)
INTERVENTRICULAR SEPTUM: 1.05 CM (ref 0.6–1.1)
IVC DIAMETER: 1.39 CM
IVRT: 145.58 MSEC
LA MAJOR: 4.82 CM
LA MINOR: 4.16 CM
LA WIDTH: 3 CM
LEFT ATRIUM SIZE: 2.12 CM
LEFT ATRIUM VOLUME INDEX: 14.8 ML/M2
LEFT ATRIUM VOLUME: 24.14 CM3
LEFT INTERNAL DIMENSION IN SYSTOLE: 2.62 CM (ref 2.1–4)
LEFT VENTRICLE DIASTOLIC VOLUME INDEX: 37.95 ML/M2
LEFT VENTRICLE DIASTOLIC VOLUME: 61.86 ML
LEFT VENTRICLE MASS INDEX: 72 G/M2
LEFT VENTRICLE SYSTOLIC VOLUME INDEX: 15.3 ML/M2
LEFT VENTRICLE SYSTOLIC VOLUME: 24.99 ML
LEFT VENTRICULAR INTERNAL DIMENSION IN DIASTOLE: 3.8 CM (ref 3.5–6)
LEFT VENTRICULAR MASS: 118.12 G
LV LATERAL E/E' RATIO: 0 M/S
LV SEPTAL E/E' RATIO: 0 M/S
LVOT MG: 4.16 MMHG
LVOT MV: 1 CM/S
LYMPHOCYTES # BLD AUTO: 0.9 K/UL (ref 1–4.8)
LYMPHOCYTES NFR BLD: 23.5 % (ref 18–48)
MAGNESIUM SERPL-MCNC: 1.9 MG/DL (ref 1.6–2.6)
MCH RBC QN AUTO: 31.9 PG (ref 27–31)
MCHC RBC AUTO-ENTMCNC: 34.8 G/DL (ref 32–36)
MCV RBC AUTO: 92 FL (ref 82–98)
MONOCYTES # BLD AUTO: 0.1 K/UL (ref 0.3–1)
MONOCYTES NFR BLD: 2.9 % (ref 4–15)
MV PEAK A VEL: 1.5 M/S
MV PEAK E VEL: 0 M/S
NEUTROPHILS # BLD AUTO: 2.7 K/UL (ref 1.8–7.7)
NEUTROPHILS NFR BLD: 73 % (ref 38–73)
NRBC BLD-RTO: 0 /100 WBC
PISA TR MAX VEL: 2.4 M/S
PLATELET # BLD AUTO: 208 K/UL (ref 150–450)
PLATELET BLD QL SMEAR: ABNORMAL
PMV BLD AUTO: 10 FL (ref 9.2–12.9)
POTASSIUM SERPL-SCNC: 4 MMOL/L (ref 3.5–5.1)
PROT SERPL-MCNC: 6.8 G/DL (ref 6–8.4)
PV MEAN GRADIENT: 1.17 MMHG
RA MAJOR: 4.12 CM
RA PRESSURE: 3 MMHG
RBC # BLD AUTO: 3.82 M/UL (ref 4–5.4)
SODIUM SERPL-SCNC: 138 MMOL/L (ref 136–145)
STJ: 3.3 CM
TDI LATERAL: 0.1 M/S
TDI SEPTAL: 0.11 M/S
TDI: 0.11 M/S
TR MAX PG: 23 MMHG
TRICUSPID ANNULAR PLANE SYSTOLIC EXCURSION: 2.8 CM
TROPONIN I SERPL DL<=0.01 NG/ML-MCNC: 0.01 NG/ML (ref 0–0.03)
TV REST PULMONARY ARTERY PRESSURE: 26 MMHG
WBC # BLD AUTO: 3.75 K/UL (ref 3.9–12.7)

## 2023-06-29 PROCEDURE — 99223 1ST HOSP IP/OBS HIGH 75: CPT | Mod: 25,,, | Performed by: INTERNAL MEDICINE

## 2023-06-29 PROCEDURE — 25000003 PHARM REV CODE 250: Performed by: INTERNAL MEDICINE

## 2023-06-29 PROCEDURE — 99223 PR INITIAL HOSPITAL CARE,LEVL III: ICD-10-PCS | Mod: 25,,, | Performed by: INTERNAL MEDICINE

## 2023-06-29 PROCEDURE — 96372 THER/PROPH/DIAG INJ SC/IM: CPT | Performed by: INTERNAL MEDICINE

## 2023-06-29 PROCEDURE — 83735 ASSAY OF MAGNESIUM: CPT | Performed by: INTERNAL MEDICINE

## 2023-06-29 PROCEDURE — G0378 HOSPITAL OBSERVATION PER HR: HCPCS

## 2023-06-29 PROCEDURE — 63600175 PHARM REV CODE 636 W HCPCS: Performed by: INTERNAL MEDICINE

## 2023-06-29 PROCEDURE — 36415 COLL VENOUS BLD VENIPUNCTURE: CPT | Performed by: INTERNAL MEDICINE

## 2023-06-29 PROCEDURE — 80053 COMPREHEN METABOLIC PANEL: CPT | Performed by: INTERNAL MEDICINE

## 2023-06-29 PROCEDURE — 85025 COMPLETE CBC W/AUTO DIFF WBC: CPT | Performed by: INTERNAL MEDICINE

## 2023-06-29 RX ORDER — CARVEDILOL 12.5 MG/1
12.5 TABLET ORAL 2 TIMES DAILY
Status: DISCONTINUED | OUTPATIENT
Start: 2023-06-29 | End: 2023-06-29 | Stop reason: HOSPADM

## 2023-06-29 RX ORDER — DEXAMETHASONE 6 MG/1
6 TABLET ORAL DAILY
Qty: 10 TABLET | Refills: 0 | Status: SHIPPED | OUTPATIENT
Start: 2023-06-30 | End: 2023-07-10

## 2023-06-29 RX ADMIN — LOSARTAN POTASSIUM 100 MG: 50 TABLET, FILM COATED ORAL at 08:06

## 2023-06-29 RX ADMIN — ASPIRIN 81 MG: 81 TABLET, COATED ORAL at 08:06

## 2023-06-29 RX ADMIN — DEXAMETHASONE 6 MG: 4 TABLET ORAL at 08:06

## 2023-06-29 RX ADMIN — ENOXAPARIN SODIUM 60 MG: 60 INJECTION SUBCUTANEOUS at 08:06

## 2023-06-29 NOTE — PLAN OF CARE
[facility] - Telemetry (Hospital)  Initial Discharge Assessment       Primary Care Provider: Primary Doctor No    Admission Diagnosis: Syncope [R55]  Low blood potassium [E87.6]  Chest pain [R07.9]  Near syncope [R55]  Anemia, unspecified type [D64.9]  Low magnesium level [R79.0]  COVID [U07.1]    Admission Date: 6/28/2023  Expected Discharge Date:     Transition of Care Barriers: None    Payor: MEDICARE / Plan: MEDICARE PART A & B / Product Type: Government /     Extended Emergency Contact Information  Primary Emergency Contact: Malissa Thomas  Mobile Phone: [phone]  Relation: Relative  Secondary Emergency Contact: angelarosy  Mobile Phone: [phone]  Relation: Daughter   needed? No    Discharge Plan A: Home, Home with family  Discharge Plan B: Home    No Pharmacies Listed    Initial Assessment (most recent)       Adult Discharge Assessment - 06/29/23 0918          Discharge Assessment    Assessment Type Discharge Planning Assessment     Confirmed/corrected address, phone number and insurance Yes     Confirmed Demographics Correct on Facesheet     Source of Information patient     Communicated DASH with patient/caregiver Date not available/Unable to determine     Reason For Admission Syncope     People in Home spouse     Facility Arrived From: Home     Do you expect to return to your current living situation? Yes     Do you have help at home or someone to help you manage your care at home? Yes     Who are your caregiver(s) and their phone number(s)? Spouse, Dexter, and daughterRosy     Prior to hospitilization cognitive status: Alert/Oriented     Current cognitive status: Alert/Oriented     Equipment Currently Used at Home none     Readmission within 30 days? No     Patient currently being followed by outpatient case management? No     Do you currently have service(s) that help you manage your care at home? No     Do you take prescription medications? Yes     Do you have prescription coverage?  "Yes     Do you have any problems affording any of your prescribed medications? No     Is the patient taking medications as prescribed? yes     Who is going to help you get home at discharge? Pt's family will coordinate transport     How do you get to doctors appointments? family or friend will provide     Are you on dialysis? No     Do you take coumadin? No     Discharge Plan A Home;Home with family     Discharge Plan B Home     DME Needed Upon Discharge  none     Discharge Plan discussed with: Adult children     Transition of Care Barriers None                   SW completed assessment with patient's daughter, Vibha, by room phone. Vibha reports patient lives at home with spouse. Pt is mostly independent with care and she and spouse assist each other at home. Pt does not use DME but Vibha states she recently purchased a "trekking" stick d/t pt's knee pain. Family will assist with transportation home.     ROB explained CM role and how to contact CM if needs arise. SW to remain available as needed.              "

## 2023-06-29 NOTE — ASSESSMENT & PLAN NOTE
-Presents s/p syncopal event  -Suspect in setting of hypotension/electrolyte derangements/COVID-19 infection  -Serial troponin negative  -Echo pending  -Continue telemetry monitoring, can decrease dose of Coreg if significant bradycardia noted  -Will need OP Vital Connect monitor  -D-dimer +, would recommend PE rule out given syncopal episode

## 2023-06-29 NOTE — PLAN OF CARE
[facility] - Telemetry (Hospital)  Discharge Final Note    Primary Care Provider: FATOU Donis    Expected Discharge Date: 6/29/2023    Final Discharge Note (most recent)       Final Note - 06/29/23 1613          Final Note    Assessment Type Final Discharge Note     Anticipated Discharge Disposition Home or Self Care        Post-Acute Status    Discharge Delays None known at this time                   Pt to discharge home today    PCP scheduled and added to AVS: Maite Robles 6/29 @ 1020    No CM needs for discharge     Important Message from Medicare             Contact Info       Calvin Cobb MD   Specialty: Interventional Cardiology, Cardiology    [address]  [place] [address]   Phone: [phone]       Next Steps: Schedule an appointment as soon as possible for a visit in 1 week(s)    Instructions: Hospital discharge follow up  Message sent to staff to coordinate follow up    FATOU Donis   Specialty: Family Medicine   Relationship: PCP - General    [address] [place] [address] PRIMARY CARE  [facility] [address]   Phone: [phone]       Next Steps: Follow up    Instructions: Hospital discharge follow up in 1-2 weeks

## 2023-06-29 NOTE — SUBJECTIVE & OBJECTIVE
Past Medical History:   Diagnosis Date    Arthritis     Hypertension        Past Surgical History:   Procedure Laterality Date    APPENDECTOMY      COLPOSCOPY, WITH BIOPSY OF CERVIX      EYE SURGERY         Review of patient's allergies indicates:   Allergen Reactions    Iodine and iodide containing products Itching and Swelling       No current facility-administered medications on file prior to encounter.     Current Outpatient Medications on File Prior to Encounter   Medication Sig    carvediloL (COREG) 12.5 MG tablet Take 12.5 mg by mouth 2 (two) times daily.    fluticasone propionate (FLOVENT DISKUS) 50 mcg/actuation DsDv Inhale into the lungs. Controller    hydroCHLOROthiazide (HYDRODIURIL) 12.5 MG Tab Take 25 mg by mouth once daily.    telmisartan (MICARDIS) 40 MG Tab Take 40 mg by mouth.    aspirin (ECOTRIN) 81 MG EC tablet Take 81 mg by mouth.     Family History       Problem Relation (Age of Onset)    Heart disease Brother          Tobacco Use    Smoking status: Never    Smokeless tobacco: Never   Substance and Sexual Activity    Alcohol use: Not on file    Drug use: Not on file    Sexual activity: Not on file     Review of Systems   Reason unable to perform ROS: as per HPI.   Objective:     Vital Signs (Most Recent):  Temp: 98.7 °F (37.1 °C) (06/29/23 0715)  Pulse: 78 (06/29/23 0900)  Resp: 18 (06/29/23 0715)  BP: (!) 141/72 (06/29/23 0715)  SpO2: 97 % (06/29/23 0715) Vital Signs (24h Range):  Temp:  [98 °F (36.7 °C)-98.8 °F (37.1 °C)] 98.7 °F (37.1 °C)  Pulse:  [60-79] 78  Resp:  [16-23] 18  SpO2:  [96 %-99 %] 97 %  BP: (127-188)/(65-83) 141/72     Weight: 64.4 kg (142 lb)  Body mass index is 26.83 kg/m².    SpO2: 97 %         Intake/Output Summary (Last 24 hours) at 6/29/2023 1043  Last data filed at 6/29/2023 0400  Gross per 24 hour   Intake 636.73 ml   Output 300 ml   Net 336.73 ml       Lines/Drains/Airways       Peripheral Intravenous Line  Duration                  Peripheral IV - Single Lumen  06/28/23 1351 20 G Left Antecubital <1 day                     Physical Exam  Vitals and nursing note reviewed.        Significant Labs: CMP   Recent Labs   Lab 06/28/23  1412 06/29/23  0509    138   K 2.9* 4.0   * 103   CO2 19* 23   GLU 79 135*   BUN 11 13   CREATININE 0.6 0.7   CALCIUM 6.6* 9.1   PROT 5.1* 6.8   ALBUMIN 2.8* 3.6   BILITOT 0.7 0.7   ALKPHOS 41* 55   AST 16 18   ALT 7* 10   ANIONGAP 9 12   , CBC   Recent Labs   Lab 06/28/23  1412 06/29/23  0510   WBC 4.29 3.75*   HGB 9.4* 12.2   HCT 28.0* 35.1*   * 208   , Troponin   Recent Labs   Lab 06/28/23  1412 06/28/23  1859 06/28/23  2354   TROPONINI <0.006 0.018 0.008   , and All pertinent lab results from the last 24 hours have been reviewed.    Significant Imaging: Echocardiogram: Transthoracic echo (TTE) complete ([facility] Only):   Results for orders placed or performed during the hospital encounter of 06/28/23   Echo   Result Value Ref Range    BSA 1.66 m2    TDI SEPTAL 0.11 m/s    LV LATERAL E/E' RATIO 0.00 m/s    LV SEPTAL E/E' RATIO 0.00 m/s    LA WIDTH 3.00 cm    IVC diameter 1.39 cm    Left Ventricular Outflow Tract Mean Velocity 1.00 cm/s    Left Ventricular Outflow Tract Mean Gradient 4.16 mmHg    TDI LATERAL 0.10 m/s    LVIDd 3.80 3.5 - 6.0 cm    IVS 1.05 0.6 - 1.1 cm    Posterior Wall 0.96 0.6 - 1.1 cm    Ao root annulus 3.30 cm    LVIDs 2.62 2.1 - 4.0 cm    FS 31 28 - 44 %    LA volume 24.14 cm3    STJ 3.30 cm    Ascending aorta 3.32 cm    LV mass 118.12 g    LA size 2.12 cm    TAPSE 2.80 cm    Left Ventricle Relative Wall Thickness 0.51 cm    AV mean gradient 5 mmHg    AV valve area 2.70 cm2    AV Velocity Ratio 0.89     AV index (prosthetic) 0.99     PV peak gradient 2.11 mmHg    E/A ratio 0.00     Mean e' 0.11 m/s    IVRT 145.58 msec    LVOT diameter 1.86 cm    LVOT area 2.7 cm2    LVOT peak kathleen 1.26 m/s    LVOT peak VTI 27.30 cm    Ao peak kathleen 1.41 m/s    Ao VTI 27.5 cm    RVOT peak kathleen 0.73 m/s    RVOT peak VTI 16.7 cm     LVOT stroke volume 74.14 cm3    AV peak gradient 8 mmHg    PV mean gradient 1.17 mmHg    E/E' ratio 0.00 m/s    MV Peak E Ken 0.00 m/s    TR Max Ken 2.40 m/s    MV Peak A Ken 1.50 m/s    LV Systolic Volume 24.99 mL    LV Systolic Volume Index 15.3 mL/m2    LV Diastolic Volume 61.86 mL    LV Diastolic Volume Index 37.95 mL/m2    LA Volume Index 14.8 mL/m2    LV Mass Index 72 g/m2    RA Major Axis 4.12 cm    Left Atrium Minor Axis 4.16 cm    Left Atrium Major Axis 4.82 cm    Triscuspid Valve Regurgitation Peak Gradient 23 mmHg   , EKG: Reviewed, and X-Ray: CXR: X-Ray Chest 1 View (CXR): No results found for this visit on 06/28/23. and X-Ray Chest PA and Lateral (CXR): No results found for this visit on 06/28/23.

## 2023-06-29 NOTE — HPI
"HPI obtained from chart as patient currently COVID positive and is on isolation precautions    Ms. Keys is an 88 year old female patient whose current medical conditions include HTN, arthritis, and reported history of arrhythmia who presented to [facility] ED yesterday s/p syncopal event. Patient reportedly "passed out"  while sitting in her recliner 15-20 minutes after taking a dose of Phergean with codeine. Patient's daughters reported they were unable to feel a pulse. They obtained a systolic BP of 70 after repeated attempts and called EMS. Duration of LOC was approximately 5 minutes. No apparent seizure-like activity. No associated SOB, chest pain, or palpitations. Of note, patient had similar episode the day prior while standing at the sink cutting pears. Initial workup in ED revealed +COVID-19 infection, hypokalemia (K 2.9), hypomagnesemia (mg 1.3), and hypocalcemia and patient was subsequently admitted for further evaluation and treatment. Cardiology consulted to assist with management. Chart reviewed. No prior CV history. Troponin negative. Echo pending. Electrolytes repleted. D-dimer +, BLE venous U/S negative for DVT.    "

## 2023-06-29 NOTE — CONSULTS
"[facility] - Telemetry (Hospital)  Cardiology  Consult Note    Patient Name: Jayshree Keys  MRN: [id number]  Admission Date: 6/28/2023  Hospital Length of Stay: 0 days  Code Status: Full Code   Attending Provider: Bob Gould MD   Consulting Provider: Luz Trejo PA-C  Primary Care Physician: Primary Doctor No  Principal Problem:Syncope    Patient information was obtained from past medical records and ER records.     Inpatient consult to Cardiology  Consult performed by: Luz Trejo PA-C  Consult ordered by: Makenzie Clarke MD        Subjective:     Chief Complaint:  Syncope     HPI:   HPI obtained from chart as patient currently COVID positive and is on isolation precautions    Ms. Keys is an 88 year old female patient whose current medical conditions include HTN, arthritis, and reported history of arrhythmia who presented to [facility] ED yesterday s/p syncopal event. Patient reportedly "passed out"  while sitting in her recliner 15-20 minutes after taking a dose of Phergean with codeine. Patient's daughters reported they were unable to feel a pulse. They obtained a systolic BP of 70 after repeated attempts and called EMS. Duration of LOC was approximately 5 minutes. No apparent seizure-like activity. No associated SOB, chest pain, or palpitations. Of note, patient had similar episode the day prior while standing at the sink cutting pears. Initial workup in ED revealed +COVID-19 infection, hypokalemia (K 2.9), hypomagnesemia (mg 1.3), and hypocalcemia and patient was subsequently admitted for further evaluation and treatment. Cardiology consulted to assist with management. Chart reviewed. No prior CV history. Troponin negative. Echo pending. Electrolytes repleted. D-dimer +, BLE venous U/S negative for DVT.        Past Medical History:   Diagnosis Date    Arthritis     Hypertension        Past Surgical History:   Procedure Laterality Date    APPENDECTOMY      COLPOSCOPY, WITH BIOPSY OF CERVIX      EYE " SURGERY         Review of patient's allergies indicates:   Allergen Reactions    Iodine and iodide containing products Itching and Swelling       No current facility-administered medications on file prior to encounter.     Current Outpatient Medications on File Prior to Encounter   Medication Sig    carvediloL (COREG) 12.5 MG tablet Take 12.5 mg by mouth 2 (two) times daily.    fluticasone propionate (FLOVENT DISKUS) 50 mcg/actuation DsDv Inhale into the lungs. Controller    hydroCHLOROthiazide (HYDRODIURIL) 12.5 MG Tab Take 25 mg by mouth once daily.    telmisartan (MICARDIS) 40 MG Tab Take 40 mg by mouth.    aspirin (ECOTRIN) 81 MG EC tablet Take 81 mg by mouth.     Family History       Problem Relation (Age of Onset)    Heart disease Brother          Tobacco Use    Smoking status: Never    Smokeless tobacco: Never   Substance and Sexual Activity    Alcohol use: Not on file    Drug use: Not on file    Sexual activity: Not on file     Review of Systems   Reason unable to perform ROS: as per HPI.   Objective:     Vital Signs (Most Recent):  Temp: 98.7 °F (37.1 °C) (06/29/23 0715)  Pulse: 78 (06/29/23 0900)  Resp: 18 (06/29/23 0715)  BP: (!) 141/72 (06/29/23 0715)  SpO2: 97 % (06/29/23 0715) Vital Signs (24h Range):  Temp:  [98 °F (36.7 °C)-98.8 °F (37.1 °C)] 98.7 °F (37.1 °C)  Pulse:  [60-79] 78  Resp:  [16-23] 18  SpO2:  [96 %-99 %] 97 %  BP: (127-188)/(65-83) 141/72     Weight: 64.4 kg (142 lb)  Body mass index is 26.83 kg/m².    SpO2: 97 %         Intake/Output Summary (Last 24 hours) at 6/29/2023 1043  Last data filed at 6/29/2023 0400  Gross per 24 hour   Intake 636.73 ml   Output 300 ml   Net 336.73 ml       Lines/Drains/Airways       Peripheral Intravenous Line  Duration                  Peripheral IV - Single Lumen 06/28/23 1351 20 G Left Antecubital <1 day                     Physical Exam  Vitals and nursing note reviewed.        Significant Labs: CMP   Recent Labs   Lab 06/28/23  1412  06/29/23  0509    138   K 2.9* 4.0   * 103   CO2 19* 23   GLU 79 135*   BUN 11 13   CREATININE 0.6 0.7   CALCIUM 6.6* 9.1   PROT 5.1* 6.8   ALBUMIN 2.8* 3.6   BILITOT 0.7 0.7   ALKPHOS 41* 55   AST 16 18   ALT 7* 10   ANIONGAP 9 12   , CBC   Recent Labs   Lab 06/28/23  1412 06/29/23  0510   WBC 4.29 3.75*   HGB 9.4* 12.2   HCT 28.0* 35.1*   * 208   , Troponin   Recent Labs   Lab 06/28/23  1412 06/28/23  1859 06/28/23  2354   TROPONINI <0.006 0.018 0.008   , and All pertinent lab results from the last 24 hours have been reviewed.    Significant Imaging: Echocardiogram: Transthoracic echo (TTE) complete ([facility] Only):   Results for orders placed or performed during the hospital encounter of 06/28/23   Echo   Result Value Ref Range    BSA 1.66 m2    TDI SEPTAL 0.11 m/s    LV LATERAL E/E' RATIO 0.00 m/s    LV SEPTAL E/E' RATIO 0.00 m/s    LA WIDTH 3.00 cm    IVC diameter 1.39 cm    Left Ventricular Outflow Tract Mean Velocity 1.00 cm/s    Left Ventricular Outflow Tract Mean Gradient 4.16 mmHg    TDI LATERAL 0.10 m/s    LVIDd 3.80 3.5 - 6.0 cm    IVS 1.05 0.6 - 1.1 cm    Posterior Wall 0.96 0.6 - 1.1 cm    Ao root annulus 3.30 cm    LVIDs 2.62 2.1 - 4.0 cm    FS 31 28 - 44 %    LA volume 24.14 cm3    STJ 3.30 cm    Ascending aorta 3.32 cm    LV mass 118.12 g    LA size 2.12 cm    TAPSE 2.80 cm    Left Ventricle Relative Wall Thickness 0.51 cm    AV mean gradient 5 mmHg    AV valve area 2.70 cm2    AV Velocity Ratio 0.89     AV index (prosthetic) 0.99     PV peak gradient 2.11 mmHg    E/A ratio 0.00     Mean e' 0.11 m/s    IVRT 145.58 msec    LVOT diameter 1.86 cm    LVOT area 2.7 cm2    LVOT peak ken 1.26 m/s    LVOT peak VTI 27.30 cm    Ao peak ken 1.41 m/s    Ao VTI 27.5 cm    RVOT peak ken 0.73 m/s    RVOT peak VTI 16.7 cm    LVOT stroke volume 74.14 cm3    AV peak gradient 8 mmHg    PV mean gradient 1.17 mmHg    E/E' ratio 0.00 m/s    MV Peak E Ken 0.00 m/s    TR Max Ken 2.40 m/s    MV Peak A Ken  1.50 m/s    LV Systolic Volume 24.99 mL    LV Systolic Volume Index 15.3 mL/m2    LV Diastolic Volume 61.86 mL    LV Diastolic Volume Index 37.95 mL/m2    LA Volume Index 14.8 mL/m2    LV Mass Index 72 g/m2    RA Major Axis 4.12 cm    Left Atrium Minor Axis 4.16 cm    Left Atrium Major Axis 4.82 cm    Triscuspid Valve Regurgitation Peak Gradient 23 mmHg   , EKG: Reviewed, and X-Ray: CXR: X-Ray Chest 1 View (CXR): No results found for this visit on 06/28/23. and X-Ray Chest PA and Lateral (CXR): No results found for this visit on 06/28/23.    Assessment and Plan:   Patient who presents s/p syncopal event. Suspect mediated by active COVID infection, hypotension, electrolyte derangements. Continue telemetry monitoring, can decrease Coreg if significant bradycardia noted. Check echo. OP Vital Connect. Would recommend PE rule out given + D-dimer.     * Syncope  -Presents s/p syncopal event  -Suspect in setting of hypotension/electrolyte derangements/COVID-19 infection  -Serial troponin negative  -Echo pending  -Continue telemetry monitoring, can decrease dose of Coreg if significant bradycardia noted  -Will need OP Vital Connect monitor  -D-dimer +, would recommend PE rule out given syncopal episode    Arrhythmia  -Continue telemetry monitoring  -OP Vital Connect    Hypomagnesemia  -Repletion as per primary team    Hypokalemia  -Repletion as per hospital medicine    COVID-19  -Mgmt as per hospital medicine        VTE Risk Mitigation (From admission, onward)         Ordered     enoxaparin injection 60 mg  Every 12 hours         06/28/23 1814     IP VTE HIGH RISK PATIENT  Once         06/28/23 1814     Place sequential compression device  Until discontinued         06/28/23 1814                Thank you for your consult. I will follow-up with patient. Please contact us if you have any additional questions.    Luz Trejo PA-C  Cardiology   [facility] ([facility])

## 2023-06-30 NOTE — DISCHARGE SUMMARY
[facility] ([facility])  [facility] Medicine  Discharge Summary      Patient Name: Jayshree Keys  MRN: [id number]  [facility]: [id number]  Patient Class: OP- Observation  Admission Date: 6/28/2023  Hospital Length of Stay: 0 days  Discharge Date and Time: 6/29/2023  5:05 PM  Attending Physician: Kelsy att. providers found   Discharging Provider: Bob Gould MD  Primary Care Provider: FATOU Donis    Primary Care Team: [facility] MEDICINE C    HPI:   The patient is an 89 yo female with past medical history of arrhythmia, hypertension, and arthritis who presented to the ED after syncopal episode. She tested positive for  COVID yesterday. She was exposed over the weekend. Yesterday she started having body aches and malaise. She took 200 mg of ibuprofen followed by a dose of naproxen yesterday. She had syncopal episode while peeling pears. She did not say anything about as she recovered quickly. Today she passed out while sitting in her recliner. Her daughter was unable to feel a pulse and the home BP cuff could not  a blood pressure. Her family called EMS and patient was laid back in the recliner. Patient had taken a dose of promethazine with codeine for cough shortly before episode today. Upon EMS arrival, her BP was noted to be in the 150s. Patient reports she fell in February and hit her head. She just remembers waking up on the floor bleeding. She states she initially thought she tripped but now she recalls she was standing and peeling an onion. COVID positive confirmed. Elevated Ddimer. Hospital medicine consulted. Patient placed in observation.    Patient reported knee injection 2 weeks ago. It was a 3 shot series a week apart.       * No surgery found *      Hospital Course:          Goals of Care Treatment Preferences:  Code Status: Full Code      Consults:   Consults (From admission, onward)        Status Ordering Provider     Inpatient consult to Cardiology  Once        Provider:  ARIS Banda  MD Yang    Completed BARBARA KRUGER          Cardiac/Vascular  Primary hypertension  Continue home medications    Arrhythmia  Patient reports beta blocker was started due to her heart fluttering  Currently bradycardic with heart rate ranging from 54-59, occasional 60    6/29  Cardiology recommended OP Vital Connect monitor  HR stable overnight, no arrhythmias noted on telemetry  Resume BB on discharge  F/U with Cardiology for further management, daughter reports patient new to area, needs to establish care.    Renal/  Hypomagnesemia  Improved with repletion    Hypokalemia  Improved with repletion PO and IV    ID  COVID  Patient is identified as High risk for severe complications of COVID 19 based on COVID risk score of 4   Initiate standard COVID protocols; COVID-19 testing ,Infection Control notification  and isolation- respiratory, contact and droplet per protocol    Diagnostics: CBC, CMP, Ferritin, CRP and Portable CXR    Management: Inhaled bronchodilators as needed for shortness of breath. and Continuous cardiac monitoring.    Advance Care Planning  Current advance care plan has not been discussed with patient/family/POA and patient currently wishes Full Code.     Stable on room air  Complete decadron course outpatient    Other  * Near syncope  Cardiac monitoring  Trend troponin  Obtain echo  Consult cardiology as recurrent and patient reports history of arrhythmia    6/29  Likely covid related with dehydration  Patient reports feeling better today  Family reports previous syncopal episode this past Feb '23 @ [facility]    D-dimer 0.83  LE US with no DVT  TTE with normal systolic function, estimated EF 65%, normal RV systolic function and size  Stable on RA, no evidence of right heart strain noted on echo, normal BNP and troponin  Stable for discharge home  Cardiology follow up for OP Vital Connect monitor    Final Active Diagnoses:    Diagnosis Date Noted POA    PRINCIPAL PROBLEM:  Near syncope [R55]  06/28/2023 Yes    COVID [U07.1] 06/28/2023 Yes    Arrhythmia [I49.9] 06/28/2023 Yes    Primary hypertension [I10] 06/28/2023 Yes    Hypokalemia [E87.6] 06/28/2023 Yes    Hypomagnesemia [E83.42] 06/28/2023 Yes      Problems Resolved During this Admission:       Discharged Condition: stable    Disposition: Home or Self Care    Follow Up:   Follow-up Information     Calvin Cobb MD. Schedule an appointment as soon as possible for a visit in 1 week(s).    Specialties: Interventional Cardiology, Cardiology  Why: Hospital discharge follow up  Message sent to staff to coordinate follow up  Contact information:  [address]  [place] [address]  [phone]             FATOU Donis Follow up on 7/5/2023.    Specialty: Family Medicine  Why: Scheduled 10:20 AM  Contact information:  [address] [address] [address]  [phone]                       Patient Instructions:      Ambulatory referral/consult to Cardiology   Standing Status: Future   Referral Priority: Urgent Referral Type: Consultation   Referral Reason: Specialty Services Required   Requested Specialty: Cardiology   Number of Visits Requested: 1     Notify your health care provider if you experience any of the following:  temperature >100.4     Notify your health care provider if you experience any of the following:  difficulty breathing or increased cough     Activity as tolerated       Significant Diagnostic Studies: Labs: All labs within the past 24 hours have been reviewed    Pending Diagnostic Studies:     None         Medications:  Reconciled Home Medications:      Medication List      START taking these medications    dexAMETHasone 6 MG tablet  Commonly known as: DECADRON  Take 1 tablet (6 mg total) by mouth once daily. for 10 days  Start taking on: June 30, 2023        CONTINUE taking these medications    aspirin 81 MG EC tablet  Commonly known as: ECOTRIN  Take 81 mg by mouth.      carvediloL 12.5 MG tablet  Commonly known as: COREG  Take 12.5 mg by mouth 2 (two) times daily.     fluticasone propionate 50 mcg/actuation Dsdv  Commonly known as: FLOVENT DISKUS  Inhale into the lungs. Controller     hydroCHLOROthiazide 12.5 MG Tab  Commonly known as: HYDRODIURIL  Take 25 mg by mouth once daily.     telmisartan 40 MG Tab  Commonly known as: MICARDIS  Take 40 mg by mouth.            Indwelling Lines/Drains at time of discharge:   Lines/Drains/Airways     None                 Time spent on the discharge of patient: 40 minutes         Bob Gould MD  Department of Hospital Medicine  '[place] - Telemetry ([facility])

## 2023-06-30 NOTE — ASSESSMENT & PLAN NOTE
Cardiac monitoring  Trend troponin  Obtain echo  Consult cardiology as recurrent and patient reports history of arrhythmia    6/29  Likely covid related with dehydration  Patient reports feeling better today  Family reports previous syncopal episode this past Feb '23 @ [facility]    D-dimer 0.83  LE US with no DVT  TTE with normal systolic function, estimated EF 65%, normal RV systolic function and size  Stable on RA, no evidence of right heart strain noted on echo, normal BNP and troponin  Stable for discharge home  Cardiology follow up for OP Vital Connect monitor

## 2023-06-30 NOTE — ASSESSMENT & PLAN NOTE
Patient is identified as High risk for severe complications of COVID 19 based on COVID risk score of 4   Initiate standard COVID protocols; COVID-19 testing ,Infection Control notification  and isolation- respiratory, contact and droplet per protocol    Diagnostics: CBC, CMP, Ferritin, CRP and Portable CXR    Management: Inhaled bronchodilators as needed for shortness of breath. and Continuous cardiac monitoring.    Advance Care Planning  Current advance care plan has not been discussed with patient/family/POA and patient currently wishes Full Code.      Stable on room air  Complete decadron course outpatient

## 2023-06-30 NOTE — ASSESSMENT & PLAN NOTE
Patient reports beta blocker was started due to her heart fluttering  Currently bradycardic with heart rate ranging from 54-59, occasional 60    6/29  Cardiology recommended OP Vital Connect monitor  HR stable overnight, no arrhythmias noted on telemetry  Resume BB on discharge  F/U with Cardiology for further management, daughter reports patient new to area, needs to establish care.

## 2023-07-05 DIAGNOSIS — I48.0 PAROXYSMAL ATRIAL FIBRILLATION: Primary | ICD-10-CM

## 2023-07-06 ENCOUNTER — HOSPITAL ENCOUNTER (OUTPATIENT)
Dept: CARDIOLOGY | Facility: HOSPITAL | Age: 88
Discharge: HOME | End: 2023-07-06
Payer: MEDICARE

## 2023-07-06 ENCOUNTER — OFFICE VISIT (OUTPATIENT)
Dept: CARDIOLOGY | Facility: CLINIC | Age: 88
End: 2023-07-06
Payer: MEDICARE

## 2023-07-06 VITALS
DIASTOLIC BLOOD PRESSURE: 90 MMHG | RESPIRATION RATE: 16 BRPM | BODY MASS INDEX: 26.73 KG/M2 | OXYGEN SATURATION: 99 % | WEIGHT: 141.56 LBS | SYSTOLIC BLOOD PRESSURE: 150 MMHG | HEART RATE: 63 BPM | HEIGHT: 61 IN

## 2023-07-06 DIAGNOSIS — I48.0 PAROXYSMAL ATRIAL FIBRILLATION: ICD-10-CM

## 2023-07-06 DIAGNOSIS — I44.0 FIRST DEGREE AV BLOCK: ICD-10-CM

## 2023-07-06 DIAGNOSIS — U07.1 COVID: ICD-10-CM

## 2023-07-06 DIAGNOSIS — R55 NEAR SYNCOPE: Primary | ICD-10-CM

## 2023-07-06 DIAGNOSIS — I10 PRIMARY HYPERTENSION: ICD-10-CM

## 2023-07-06 PROCEDURE — 93010 ELECTROCARDIOGRAM REPORT: CPT | Mod: ,,, | Performed by: STUDENT IN AN ORGANIZED HEALTH CARE EDUCATION/TRAINING PROGRAM

## 2023-07-06 PROCEDURE — 99215 PR OFFICE/OUTPT VISIT, EST, LEVL V, 40-54 MIN: ICD-10-PCS | Mod: S$PBB,,, | Performed by: NURSE PRACTITIONER

## 2023-07-06 PROCEDURE — 99999 PR PBB SHADOW E&M-EST. PATIENT-LVL IV: ICD-10-PCS | Mod: PBBFAC,,, | Performed by: NURSE PRACTITIONER

## 2023-07-06 PROCEDURE — 93005 ELECTROCARDIOGRAM TRACING: CPT

## 2023-07-06 PROCEDURE — 99999 PR PBB SHADOW E&M-EST. PATIENT-LVL IV: CPT | Mod: PBBFAC,,, | Performed by: NURSE PRACTITIONER

## 2023-07-06 PROCEDURE — 99214 OFFICE O/P EST MOD 30 MIN: CPT | Mod: PBBFAC | Performed by: NURSE PRACTITIONER

## 2023-07-06 PROCEDURE — 93010 EKG 12-LEAD: ICD-10-PCS | Mod: ,,, | Performed by: STUDENT IN AN ORGANIZED HEALTH CARE EDUCATION/TRAINING PROGRAM

## 2023-07-06 PROCEDURE — 99215 OFFICE O/P EST HI 40 MIN: CPT | Mod: S$PBB,,, | Performed by: NURSE PRACTITIONER

## 2023-07-06 NOTE — PROGRESS NOTES
Subjective:   Patient ID:  Jayshree Keys is a 88 y.o. female who presents for evaluation of Establish Care and Hospital Follow Up      HPI    Jayshree Keys is a 88 year old female who presents to clinic for hospital follow up.     She was recently diagnosed with covid, near syncope. She was discharged home and returns today. She had syncopal event while sitting in her recliner at home.     EKG today reveals- SB FDAVB, HR 55    She returns today and states she is doing ok but still feels poorly.     Denies chest pain or anginal equivalents. No shortness of breath, YOUNG or palpitations. Denies orthopnea, PND or abdominal bloating. Reports regular walking without any issues lately. NO leg swelling or claudications. No recent falls, syncope or near syncopal events. Reports compliance with medications and dietary restrictions. NO CNS complaints to suggest TIA or CVA today. No signs of abnormal bleeding on ASA daily.       She does endorse daily episodes of LH since hospital discharge.     Past Medical History:   Diagnosis Date    Arthritis     Hypertension        Past Surgical History:   Procedure Laterality Date    APPENDECTOMY      COLPOSCOPY, WITH BIOPSY OF CERVIX      EYE SURGERY         Social History     Tobacco Use    Smoking status: Never    Smokeless tobacco: Never       Family History   Problem Relation Age of Onset    Heart disease Brother        Wt Readings from Last 3 Encounters:   07/06/23 64.2 kg (141 lb 8.6 oz)   06/29/23 64.4 kg (142 lb)     Temp Readings from Last 3 Encounters:   06/29/23 100.3 °F (37.9 °C) (Oral)   08/25/21 97.3 °F (36.3 °C)     BP Readings from Last 3 Encounters:   07/06/23 (!) 150/90   06/29/23 (!) 167/69   08/25/21 (!) 178/84     Pulse Readings from Last 3 Encounters:   07/06/23 63   06/29/23 71   08/25/21 72       Current Outpatient Medications on File Prior to Visit   Medication Sig Dispense Refill    aspirin (ECOTRIN) 81 MG EC tablet Take 81 mg by mouth.      carvediloL (COREG) 12.5  MG tablet Take 12.5 mg by mouth 2 (two) times daily.      dexAMETHasone (DECADRON) 6 MG tablet Take 1 tablet (6 mg total) by mouth once daily. for 10 days 10 tablet 0    fluticasone propionate (FLOVENT DISKUS) 50 mcg/actuation DsDv Inhale into the lungs. Controller      hydroCHLOROthiazide (HYDRODIURIL) 12.5 MG Tab Take 25 mg by mouth once daily.      telmisartan (MICARDIS) 40 MG Tab Take 40 mg by mouth.       Current Facility-Administered Medications on File Prior to Visit   Medication Dose Route Frequency Provider Last Rate Last Admin    acetaminophen tablet 650 mg  650 mg Oral Once PRN Babak Belcher MD        albuterol inhaler 2 puff  2 puff Inhalation Q20 Min PRN Babak Belcher MD        diphenhydrAMINE injection 25 mg  25 mg Intravenous Once PRN Babak Beclher MD        EPINEPHrine (EPIPEN) 0.3 mg/0.3 mL pen injection 0.3 mg  0.3 mg Intramuscular PRN Babak Belcher MD        methylPREDNISolone sodium succinate injection 40 mg  40 mg Intravenous Once PRN Babak Belcher MD        ondansetron disintegrating tablet 4 mg  4 mg Oral Once PRN Babak Belcher MD        sodium chloride 0.9% 500 mL flush bag   Intravenous PRN Babak Belcher MD        sodium chloride 0.9% flush 10 mL  10 mL Intravenous PRN Babak Belcher MD           No cardiac monitor results found for the past 12 months    Results for orders placed during the hospital encounter of 06/28/23    Echo    Interpretation Summary  · The left ventricle is normal in size with normal systolic function.  · The estimated ejection fraction is 65%.  · Indeterminate left ventricular diastolic function.  · Normal right ventricular size with normal right ventricular systolic function.  · Normal central venous pressure (3 mmHg).  · The estimated PA systolic pressure is 26 mmHg.    No results found for this or any previous visit.        Results for orders placed or performed during the hospital encounter of 06/28/23   EKG 12-lead    Collection  "Time: 06/28/23  1:02 PM    Narrative    Test Reason : R55,    Vent. Rate : 059 BPM     Atrial Rate : 059 BPM     P-R Int : 284 ms          QRS Dur : 110 ms      QT Int : 458 ms       P-R-T Axes : 070 -53 086 degrees     QTc Int : 453 ms    Sinus bradycardia with 1st degree A-V block  Left axis deviation  Minimal voltage criteria for LVH, may be normal variant ( [place] product )  Inferior infarct ,age undetermined  Anterolateral infarct ,age undetermined  Abnormal ECG  No previous ECGs available  Confirmed by LISA FRENCH MD ([id number]) on 6/28/2023 8:35:03 PM    Referred By: [email or web address]   SELF           Confirmed By:LISA FRENCH MD         Review of Systems   Constitutional: Positive for malaise/fatigue.   HENT:  Negative for hearing loss and hoarse voice.    Eyes:  Negative for blurred vision and visual disturbance.   Cardiovascular:  Positive for syncope. Negative for chest pain, claudication, dyspnea on exertion, irregular heartbeat, leg swelling, near-syncope, orthopnea, palpitations and paroxysmal nocturnal dyspnea.   Respiratory:  Negative for cough, hemoptysis, shortness of breath, sleep disturbances due to breathing, snoring and wheezing.    Endocrine: Negative for cold intolerance and heat intolerance.   Hematologic/Lymphatic: Does not bruise/bleed easily.   Skin:  Negative for color change, dry skin and nail changes.   Musculoskeletal:  Positive for arthritis and back pain. Negative for joint pain and myalgias.   Gastrointestinal:  Negative for bloating, abdominal pain, constipation, nausea and vomiting.   Genitourinary:  Negative for dysuria, flank pain, hematuria and hesitancy.   Neurological:  Negative for headaches, light-headedness, loss of balance, numbness, paresthesias and weakness.   Psychiatric/Behavioral:  Negative for altered mental status.    Allergic/Immunologic: Negative for environmental allergies.       Objective:BP (!) 150/90   Pulse 63   Resp 16   Ht 5' 1" (1.549 m)   Wt 64.2 kg (141 lb 8.6 " oz)   SpO2 99%   BMI 26.74 kg/m²      Physical Exam  Vitals and nursing note reviewed.   Constitutional:       General: She is not in acute distress.     Appearance: Normal appearance. She is well-developed. She is not ill-appearing.   HENT:      Head: Normocephalic and atraumatic.      Nose: Nose normal.      Mouth/Throat:      Mouth: Mucous membranes are moist.   Eyes:      Pupils: Pupils are equal, round, and reactive to light.   Neck:      Thyroid: No thyromegaly.      Vascular: No JVD.      Trachea: No tracheal deviation.   Cardiovascular:      Rate and Rhythm: Regular rhythm. Bradycardia present.      Chest Wall: PMI is not displaced.      Pulses: Intact distal pulses.           Radial pulses are 2+ on the right side and 2+ on the left side.        Dorsalis pedis pulses are 2+ on the right side and 2+ on the left side.      Heart sounds: S1 normal and S2 normal. Heart sounds not distant. No murmur heard.  Pulmonary:      Effort: Pulmonary effort is normal. No respiratory distress.      Breath sounds: Normal breath sounds. No wheezing.   Abdominal:      General: Bowel sounds are normal. There is no distension.      Palpations: Abdomen is soft.      Tenderness: There is no abdominal tenderness.   Musculoskeletal:         General: No swelling. Normal range of motion.      Cervical back: Full passive range of motion without pain, normal range of motion and neck supple.      Right lower leg: No edema.      Left lower leg: No edema.      Right ankle: No swelling.      Left ankle: No swelling.   Skin:     General: Skin is warm and dry.      Capillary Refill: Capillary refill takes less than 2 seconds.      Nails: There is no clubbing.   Neurological:      General: No focal deficit present.      Mental Status: She is alert and oriented to person, place, and time.      Motor: No weakness.   Psychiatric:         Speech: Speech normal.         Behavior: Behavior normal.         Thought Content: Thought content normal.          Judgment: Judgment normal.       No results found for: CHOL  No results found for: HDL  No results found for: LDLCALC  No results found for: TRIG  No results found for: CHOLHDL    Chemistry        Component Value Date/Time     06/29/2023 0509    K 4.0 06/29/2023 0509     06/29/2023 0509    CO2 23 06/29/2023 0509    BUN 13 06/29/2023 0509    CREATININE 0.7 06/29/2023 0509     (H) 06/29/2023 0509        Component Value Date/Time    CALCIUM 9.1 06/29/2023 0509    ALKPHOS 55 06/29/2023 0509    AST 18 06/29/2023 0509    ALT 10 06/29/2023 0509    BILITOT 0.7 06/29/2023 0509          No results found for: TSH  No results found for: INR, PROTIME  Lab Results   Component Value Date    WBC 3.75 (L) 06/29/2023    HGB 12.2 06/29/2023    HCT 35.1 (L) 06/29/2023    MCV 92 06/29/2023     06/29/2023          Assessment:      1. Near syncope    2. First degree AV block    3. Primary hypertension    4. COVID        Plan:     14 day vital connect  MPI stress test to rule out ischemia given recent covid infection  Continue Coreg, ARB, ASA, HCTZ  RTC in 4-6 weeks to discuss test results.     Nicole May, FNP-C [facility]

## 2023-07-31 ENCOUNTER — HOSPITAL ENCOUNTER (OUTPATIENT)
Dept: CARDIOLOGY | Facility: HOSPITAL | Age: 88
Discharge: HOME | End: 2023-07-31
Attending: NURSE PRACTITIONER
Payer: MEDICARE

## 2023-07-31 ENCOUNTER — HOSPITAL ENCOUNTER (OUTPATIENT)
Dept: RADIOLOGY | Facility: HOSPITAL | Age: 88
Discharge: HOME | End: 2023-07-31
Attending: NURSE PRACTITIONER
Payer: MEDICARE

## 2023-07-31 DIAGNOSIS — R55 NEAR SYNCOPE: ICD-10-CM

## 2023-07-31 DIAGNOSIS — I44.0 FIRST DEGREE AV BLOCK: ICD-10-CM

## 2023-07-31 LAB
CV STRESS BASE HR: 63 BPM
DIASTOLIC BLOOD PRESSURE: 84 MMHG
NUC REST EJECTION FRACTION: 61
NUC STRESS EJECTION FRACTION: 72 %
OHS CV CPX 85 PERCENT MAX PREDICTED HEART RATE MALE: 110
OHS CV CPX ESTIMATED METS: 1
OHS CV CPX MAX PREDICTED HEART RATE: 129
OHS CV CPX PATIENT IS FEMALE: 1
OHS CV CPX PATIENT IS MALE: 0
OHS CV CPX PEAK DIASTOLIC BLOOD PRESSURE: 69 MMHG
OHS CV CPX PEAK HEAR RATE: 94 BPM
OHS CV CPX PEAK RATE PRESSURE PRODUCT: NORMAL
OHS CV CPX PEAK SYSTOLIC BLOOD PRESSURE: 133 MMHG
OHS CV CPX PERCENT MAX PREDICTED HEART RATE ACHIEVED: 73
OHS CV CPX RATE PRESSURE PRODUCT PRESENTING: NORMAL
STRESS ECHO POST EXERCISE DUR MIN: 0 MINUTES
STRESS ECHO POST EXERCISE DUR SEC: 44 SECONDS
SYSTOLIC BLOOD PRESSURE: 159 MMHG

## 2023-07-31 PROCEDURE — 93016 CV STRESS TEST SUPVJ ONLY: CPT | Mod: ,,, | Performed by: INTERNAL MEDICINE

## 2023-07-31 PROCEDURE — 93018 CV STRESS TEST I&R ONLY: CPT | Mod: ,,, | Performed by: INTERNAL MEDICINE

## 2023-07-31 PROCEDURE — 93248 CV CARDIAC MONITOR - 3-15 DAY ADULT (CUPID ONLY): ICD-10-PCS | Mod: ,,, | Performed by: INTERNAL MEDICINE

## 2023-07-31 PROCEDURE — 78452 NUCLEAR STRESS - CARDIOLOGY INTERPRETED (CUPID ONLY): ICD-10-PCS | Mod: 26,,, | Performed by: INTERNAL MEDICINE

## 2023-07-31 PROCEDURE — 78452 HT MUSCLE IMAGE SPECT MULT: CPT

## 2023-07-31 PROCEDURE — 93248 EXT ECG>7D<15D REV&INTERPJ: CPT | Mod: ,,, | Performed by: INTERNAL MEDICINE

## 2023-07-31 PROCEDURE — 78452 HT MUSCLE IMAGE SPECT MULT: CPT | Mod: 26,,, | Performed by: INTERNAL MEDICINE

## 2023-07-31 PROCEDURE — 93017 CV STRESS TEST TRACING ONLY: CPT

## 2023-07-31 PROCEDURE — 63600175 PHARM REV CODE 636 W HCPCS: Performed by: NURSE PRACTITIONER

## 2023-07-31 PROCEDURE — A9502 TC99M TETROFOSMIN: HCPCS

## 2023-07-31 PROCEDURE — 93018 NUCLEAR STRESS - CARDIOLOGY INTERPRETED (CUPID ONLY): ICD-10-PCS | Mod: ,,, | Performed by: INTERNAL MEDICINE

## 2023-07-31 PROCEDURE — 93016 NUCLEAR STRESS - CARDIOLOGY INTERPRETED (CUPID ONLY): ICD-10-PCS | Mod: ,,, | Performed by: INTERNAL MEDICINE

## 2023-07-31 RX ORDER — REGADENOSON 0.08 MG/ML
0.4 INJECTION, SOLUTION INTRAVENOUS ONCE
Status: COMPLETED | OUTPATIENT
Start: 2023-07-31 | End: 2023-07-31

## 2023-07-31 RX ADMIN — REGADENOSON 0.4 MG: 0.08 INJECTION, SOLUTION INTRAVENOUS at 10:07

## 2023-08-01 ENCOUNTER — TELEPHONE (OUTPATIENT)
Dept: CARDIOLOGY | Facility: CLINIC | Age: 88
End: 2023-08-01
Payer: MEDICARE

## 2023-08-01 NOTE — PROGRESS NOTES
Please call patient to discuss test results    Stress test reviewed  Negative for ischemia    Thanks

## 2023-08-01 NOTE — TELEPHONE ENCOUNTER
Notified the patient            Please call patient to discuss test results     Stress test reviewed   Negative for ischemia

## 2023-08-01 NOTE — TELEPHONE ENCOUNTER
Attempted to reach the patient. LVM             Please call patient to discuss test results     Stress test reviewed   Negative for ischemia

## 2023-08-15 LAB
OHS CV HOLTER SINUS AVERAGE HR: 69
OHS CV HOLTER SINUS MAX HR: 91
OHS CV HOLTER SINUS MIN HR: 54

## 2023-08-16 ENCOUNTER — TELEPHONE (OUTPATIENT)
Dept: CARDIOLOGY | Facility: CLINIC | Age: 88
End: 2023-08-16
Payer: MEDICARE

## 2023-08-16 DIAGNOSIS — I47.10 SVT (SUPRAVENTRICULAR TACHYCARDIA): Primary | ICD-10-CM

## 2023-08-16 RX ORDER — CARVEDILOL 25 MG/1
25 TABLET ORAL 2 TIMES DAILY
Qty: 60 TABLET | Refills: 6 | Status: SHIPPED | OUTPATIENT
Start: 2023-08-16 | End: 2023-08-22

## 2023-08-16 NOTE — TELEPHONE ENCOUNTER
Attempted to reach the patient , lvm            Extended monitor reviewed in detail  -11 episodes of SVT (fast heartbeats) noted  -Fastest episode with  bpm  -No AFIB or AV block  -No Pauses or arrhythmias noted  -Increase Coreg to 25 mg BID     Keep next visit as scheduled  Send any questions or concerns to me to address     FATOU Lucas

## 2023-08-16 NOTE — TELEPHONE ENCOUNTER
Please call patient to discuss Vital Connect results    Tell patient  -Extended monitor reviewed in detail  -11 episodes of SVT (fast heartbeats) noted  -Fastest episode with  bpm  -No AFIB or AV block  -No Pauses or arrhythmias noted  -Increase Coreg to 25 mg BID    Keep next visit as scheduled  Send any questions or concerns to me to address    CADE Lucas-C

## 2023-08-17 NOTE — TELEPHONE ENCOUNTER
Patient  has been notified of the results and medication changes and has verbalized understanding. The patient has no questions at the moment and will be here in the office on 08/21              Extended monitor reviewed in detail  -11 episodes of SVT (fast heartbeats) noted  -Fastest episode with  bpm  -No AFIB or AV block  -No Pauses or arrhythmias noted  -Increase Coreg to 25 mg BID     Keep next visit as scheduled  Send any questions or concerns to me to address

## 2023-08-21 ENCOUNTER — OFFICE VISIT (OUTPATIENT)
Dept: CARDIOLOGY | Facility: CLINIC | Age: 88
End: 2023-08-21
Payer: MEDICARE

## 2023-08-21 VITALS
BODY MASS INDEX: 27.78 KG/M2 | SYSTOLIC BLOOD PRESSURE: 160 MMHG | DIASTOLIC BLOOD PRESSURE: 90 MMHG | HEART RATE: 89 BPM | OXYGEN SATURATION: 95 % | WEIGHT: 147.06 LBS

## 2023-08-21 DIAGNOSIS — I47.10 SVT (SUPRAVENTRICULAR TACHYCARDIA): Primary | ICD-10-CM

## 2023-08-21 DIAGNOSIS — I10 PRIMARY HYPERTENSION: ICD-10-CM

## 2023-08-21 DIAGNOSIS — I49.9 CARDIAC ARRHYTHMIA, UNSPECIFIED CARDIAC ARRHYTHMIA TYPE: ICD-10-CM

## 2023-08-21 DIAGNOSIS — I44.0 FIRST DEGREE AV BLOCK: ICD-10-CM

## 2023-08-21 DIAGNOSIS — R55 NEAR SYNCOPE: ICD-10-CM

## 2023-08-21 PROCEDURE — 99214 PR OFFICE/OUTPT VISIT, EST, LEVL IV, 30-39 MIN: ICD-10-PCS | Mod: S$PBB,,, | Performed by: NURSE PRACTITIONER

## 2023-08-21 PROCEDURE — 99213 OFFICE O/P EST LOW 20 MIN: CPT | Mod: PBBFAC | Performed by: NURSE PRACTITIONER

## 2023-08-21 PROCEDURE — 99999 PR PBB SHADOW E&M-EST. PATIENT-LVL III: CPT | Mod: PBBFAC,,, | Performed by: NURSE PRACTITIONER

## 2023-08-21 PROCEDURE — 99999 PR PBB SHADOW E&M-EST. PATIENT-LVL III: ICD-10-PCS | Mod: PBBFAC,,, | Performed by: NURSE PRACTITIONER

## 2023-08-21 PROCEDURE — 99214 OFFICE O/P EST MOD 30 MIN: CPT | Mod: S$PBB,,, | Performed by: NURSE PRACTITIONER

## 2023-08-21 RX ORDER — VERAPAMIL HYDROCHLORIDE 120 MG/1
120 CAPSULE, EXTENDED RELEASE ORAL NIGHTLY
Qty: 30 CAPSULE | Refills: 11 | Status: SHIPPED | OUTPATIENT
Start: 2023-08-21 | End: 2023-08-28 | Stop reason: SDUPTHER

## 2023-08-21 RX ORDER — CHOLECALCIFEROL (VITAMIN D3) 25 MCG
1000 TABLET ORAL DAILY
COMMUNITY

## 2023-08-21 NOTE — PROGRESS NOTES
Subjective:   Patient ID:  Jayshree Keys is a 88 y.o. female who presents for evaluation of No chief complaint on file.      HPI    Jayshree Keys is a 88 year old female who presents to clinic for hospital follow up.     She was recently diagnosed with covid, near syncope. She was discharged home and returns today. She had syncopal event while sitting in her recliner at home.     EKG today reveals- SB FDAVB, HR 55    She returns today and states she is doing ok but still feels poorly.     Denies chest pain or anginal equivalents. No shortness of breath, YOUNG or palpitations. Denies orthopnea, PND or abdominal bloating. Reports regular walking without any issues lately. NO leg swelling or claudications. No recent falls, syncope or near syncopal events. Reports compliance with medications and dietary restrictions. NO CNS complaints to suggest TIA or CVA today. No signs of abnormal bleeding on ASA daily.       She does endorse daily episodes of LH since hospital discharge.       8/21/2023 update    Jayshree Keys returns for follow up.     No recurrent syncopal events.     Nuke Stress negative for ischemia  Vital connect- 11 episodes of SVT noted.     BP elevated today in office, has been running high at home recently as well.     Denies chest pain or anginal equivalents. No shortness of breath, YOUNG or palpitations. Denies orthopnea, PND or abdominal bloating. Reports regular walking without any issues lately. NO leg swelling or claudications. No recent falls, syncope or near syncopal events. Reports compliance with medications and dietary restrictions. NO CNS complaints to suggest TIA or CVA today. No signs of abnormal bleeding on ASA daily.             Past Medical History:   Diagnosis Date    Arthritis     Hypertension        Past Surgical History:   Procedure Laterality Date    APPENDECTOMY      COLPOSCOPY, WITH BIOPSY OF CERVIX      EYE SURGERY         Social History     Tobacco Use    Smoking status: Never    Smokeless  tobacco: Never       Family History   Problem Relation Age of Onset    Heart disease Brother        Wt Readings from Last 3 Encounters:   08/21/23 66.7 kg (147 lb 0.8 oz)   07/06/23 64.2 kg (141 lb 8.6 oz)   06/29/23 64.4 kg (142 lb)     Temp Readings from Last 3 Encounters:   06/29/23 100.3 °F (37.9 °C) (Oral)   08/25/21 97.3 °F (36.3 °C)     BP Readings from Last 3 Encounters:   08/21/23 (!) 160/90   07/06/23 (!) 150/90   06/29/23 (!) 167/69     Pulse Readings from Last 3 Encounters:   08/21/23 89   07/06/23 63   06/29/23 71       Current Outpatient Medications on File Prior to Visit   Medication Sig Dispense Refill    aspirin (ECOTRIN) 81 MG EC tablet Take 81 mg by mouth.      carvediloL (COREG) 25 MG tablet Take 1 tablet (25 mg total) by mouth 2 (two) times daily. 60 tablet 6    hydroCHLOROthiazide (HYDRODIURIL) 12.5 MG Tab Take 25 mg by mouth once daily.      telmisartan (MICARDIS) 40 MG Tab Take 40 mg by mouth.      vitamin D (VITAMIN D3) 1000 units Tab Take 1,000 Units by mouth once daily.      fluticasone propionate (FLOVENT DISKUS) 50 mcg/actuation DsDv Inhale into the lungs. Controller       Current Facility-Administered Medications on File Prior to Visit   Medication Dose Route Frequency Provider Last Rate Last Admin    acetaminophen tablet 650 mg  650 mg Oral Once PRN Babak Belcher MD        albuterol inhaler 2 puff  2 puff Inhalation Q20 Min NATALIYAN Babak Belcher MD        diphenhydrAMINE injection 25 mg  25 mg Intravenous Once PRN Babak Belcher MD        EPINEPHrine (EPIPEN) 0.3 mg/0.3 mL pen injection 0.3 mg  0.3 mg Intramuscular PRN Babak Belcher MD        methylPREDNISolone sodium succinate injection 40 mg  40 mg Intravenous Once PRN Babak Belcher MD        ondansetron disintegrating tablet 4 mg  4 mg Oral Once PRN Babak Belcher MD        sodium chloride 0.9% 500 mL flush bag   Intravenous PRN Babak Belcher MD        sodium chloride 0.9% flush 10 mL  10 mL Intravenous  Babak Lewis MD           Cardiac Monitor - 3-15 Day Adult (Cupid Only)    Result Date: 8/15/2023    The predominant rhythm is sinus.    Heart rates varied between 54 and 91 BPM with an average of 69 BPM.    Patient Reported Event #1 There were no patient reported events.    There were 11 runs and lasting to 8 beats. The rate varied between and   144 bpm.    The patient was asymptomatic with SVT.    The patient was monitored for a total of 13d 23h, underlying rhythm is   Sinus.  The minimum heart rate was 54 bpm; the maximum 91 bpm; the average 69 bpm.  0 % of Atrial fibrillation/Atrial flutter with longest episode of 0 ms.  -- AV block with 0 %  There were 0 pauses, the longest pause was 0 ms at --.  0 episodes of VT were found with Longest VT at 0 s .  11 supraventricular episodes were found. Longest SVT Episode 8 beats,   Fastest  bpm  There were a total of 0 PVCs with 0 morphologies and 0 couplets. Overall   PVC [place] at 0 %  There were a total of 960 PSVCs with 1 morphologies and 35 couplets.   Overall PSVC [place] at 0.07 %  There is a total of 0 patient events.         Results for orders placed during the hospital encounter of 06/28/23    Echo    Interpretation Summary  · The left ventricle is normal in size with normal systolic function.  · The estimated ejection fraction is 65%.  · Indeterminate left ventricular diastolic function.  · Normal right ventricular size with normal right ventricular systolic function.  · Normal central venous pressure (3 mmHg).  · The estimated PA systolic pressure is 26 mmHg.    No results found for this or any previous visit.        Results for orders placed or performed during the hospital encounter of 07/06/23   SCHEDULED EKG 12-LEAD (to Muse)    Collection Time: 07/06/23  3:39 PM    Narrative    Test Reason : I48.0,    Vent. Rate : 055 BPM     Atrial Rate : 055 BPM     P-R Int : 256 ms          QRS Dur : 104 ms      QT Int : 466 ms       P-R-T Axes : 050 -57  096 degrees     QTc Int : 445 ms    Sinus bradycardia with 1st degree A-V block  Left axis deviation  LVH with repolarization abnormality ( R in aVL , [place] product )  Inferior infarct (cited on or before 28-JUN-2023)  Anterolateral infarct (cited on or before 28-JUN-2023)  Abnormal ECG  When compared with ECG of 28-JUN-2023 13:02,  Serial changes of Anterior infarct Present  Confirmed by Nitza Perkins MD ([id number]) on 7/7/2023 4:47:49 AM    Referred By: MALU ESCAMILLA           Confirmed By:Nitza Perkins MD         Review of Systems   Constitutional: Positive for malaise/fatigue.   HENT:  Negative for hearing loss and hoarse voice.    Eyes:  Negative for blurred vision and visual disturbance.   Cardiovascular:  Positive for syncope. Negative for chest pain, claudication, dyspnea on exertion, irregular heartbeat, leg swelling, near-syncope, orthopnea, palpitations and paroxysmal nocturnal dyspnea.   Respiratory:  Negative for cough, hemoptysis, shortness of breath, sleep disturbances due to breathing, snoring and wheezing.    Endocrine: Negative for cold intolerance and heat intolerance.   Hematologic/Lymphatic: Does not bruise/bleed easily.   Skin:  Negative for color change, dry skin and nail changes.   Musculoskeletal:  Positive for arthritis and back pain. Negative for joint pain and myalgias.   Gastrointestinal:  Negative for bloating, abdominal pain, constipation, nausea and vomiting.   Genitourinary:  Negative for dysuria, flank pain, hematuria and hesitancy.   Neurological:  Negative for headaches, light-headedness, loss of balance, numbness, paresthesias and weakness.   Psychiatric/Behavioral:  Negative for altered mental status.    Allergic/Immunologic: Negative for environmental allergies.         Objective:BP (!) 160/90 (BP Location: Right arm, Patient Position: Sitting)   Pulse 89   Wt 66.7 kg (147 lb 0.8 oz)   SpO2 95%   BMI 27.78 kg/m²      Physical Exam  Vitals and nursing note reviewed.  "  Constitutional:       General: She is not in acute distress.     Appearance: Normal appearance. She is well-developed. She is not ill-appearing.   HENT:      Head: Normocephalic and atraumatic.      Nose: Nose normal.      Mouth/Throat:      Mouth: Mucous membranes are moist.   Eyes:      Pupils: Pupils are equal, round, and reactive to light.   Neck:      Thyroid: No thyromegaly.      Vascular: No JVD.      Trachea: No tracheal deviation.   Cardiovascular:      Rate and Rhythm: Regular rhythm. Bradycardia present.      Chest Wall: PMI is not displaced.      Pulses: Intact distal pulses.           Radial pulses are 2+ on the right side and 2+ on the left side.        Dorsalis pedis pulses are 2+ on the right side and 2+ on the left side.      Heart sounds: S1 normal and S2 normal. Heart sounds not distant. No murmur heard.  Pulmonary:      Effort: Pulmonary effort is normal. No respiratory distress.      Breath sounds: Normal breath sounds. No wheezing.   Abdominal:      General: Bowel sounds are normal. There is no distension.      Palpations: Abdomen is soft.      Tenderness: There is no abdominal tenderness.   Musculoskeletal:         General: No swelling. Normal range of motion.      Cervical back: Full passive range of motion without pain, normal range of motion and neck supple.      Right lower leg: No edema.      Left lower leg: No edema.      Right ankle: No swelling.      Left ankle: No swelling.   Skin:     General: Skin is warm and dry.      Capillary Refill: Capillary refill takes less than 2 seconds.      Nails: There is no clubbing.   Neurological:      General: No focal deficit present.      Mental Status: She is alert and oriented to person, place, and time.      Motor: No weakness.   Psychiatric:         Speech: Speech normal.         Behavior: Behavior normal.         Thought Content: Thought content normal.         Judgment: Judgment normal.         No results found for: "CHOL"  No results found " "for: "HDL"  No results found for: "LDLCALC"  No results found for: "TRIG"  No results found for: "CHOLHDL"    Chemistry        Component Value Date/Time     06/29/2023 0509    K 4.0 06/29/2023 0509     06/29/2023 0509    CO2 23 06/29/2023 0509    BUN 13 06/29/2023 0509    CREATININE 0.7 06/29/2023 0509     (H) 06/29/2023 0509        Component Value Date/Time    CALCIUM 9.1 06/29/2023 0509    ALKPHOS 55 06/29/2023 0509    AST 18 06/29/2023 0509    ALT 10 06/29/2023 0509    BILITOT 0.7 06/29/2023 0509          No results found for: "TSH"  No results found for: "INR", "PROTIME"  Lab Results   Component Value Date    WBC 3.75 (L) 06/29/2023    HGB 12.2 06/29/2023    HCT 35.1 (L) 06/29/2023    MCV 92 06/29/2023     06/29/2023          Assessment:      1. SVT (supraventricular tachycardia)    2. Primary hypertension    3. First degree AV block    4. Cardiac arrhythmia, unspecified cardiac arrhythmia type    5. Near syncope          Plan:   Add Verapamil 120 mg nightly  Continue Coreg, ARB, HCTZ  Dash diet 2 gm sodium restriction  Profile BP HR at home  RTC in 6 months or sooner if needed.     FATOU [facility]    "

## 2023-08-22 DIAGNOSIS — I47.10 SVT (SUPRAVENTRICULAR TACHYCARDIA): ICD-10-CM

## 2023-08-22 RX ORDER — CARVEDILOL 25 MG/1
25 TABLET ORAL 2 TIMES DAILY
Qty: 180 TABLET | Refills: 3 | OUTPATIENT
Start: 2023-08-22

## 2023-08-28 DIAGNOSIS — I47.10 SVT (SUPRAVENTRICULAR TACHYCARDIA): ICD-10-CM

## 2023-08-28 RX ORDER — VERAPAMIL HYDROCHLORIDE 120 MG/1
120 CAPSULE, EXTENDED RELEASE ORAL NIGHTLY
Qty: 90 CAPSULE | Refills: 3 | Status: SHIPPED | OUTPATIENT
Start: 2023-08-28 | End: 2024-02-22

## 2023-09-11 ENCOUNTER — TELEPHONE (OUTPATIENT)
Dept: CARDIOLOGY | Facility: CLINIC | Age: 88
End: 2023-09-11
Payer: MEDICARE

## 2023-09-11 NOTE — TELEPHONE ENCOUNTER
Sharmin Lewis, MADISONP-C  Debbie Finney Staff 3 hours ago (1:38 PM)       Please advise patient she can hold verapamil for next 3 days to see if symptoms improve     Thanks    The patient has been notified of this information and all questions answered.  Informed pt to call us back and let us know if it helped -

## 2023-09-11 NOTE — TELEPHONE ENCOUNTER
Patient stated that she is having extreme swelling in legs and feet, very lightheaded, body aches, and extremely tired. The patient also stated that her pulse has been running at 50. The patient would like to know if these are sides effects of Coreg or Verapamil.

## 2023-09-15 ENCOUNTER — TELEPHONE (OUTPATIENT)
Dept: CARDIOLOGY | Facility: CLINIC | Age: 88
End: 2023-09-15
Payer: MEDICARE

## 2023-09-15 NOTE — TELEPHONE ENCOUNTER
Spoke to patient to let her know to stay of of the Verapamil and call if she has any other cardiac issue before her next appointment.

## 2023-09-15 NOTE — TELEPHONE ENCOUNTER
LM for pt to call us back            ----- Message from Kezia Astudillo sent at 9/15/2023  1:25 PM CDT -----  Regarding: medication  Contact: [phone]  Pt is on this medication as well: Nitrofurantoin Mon-debbie.

## 2023-09-15 NOTE — TELEPHONE ENCOUNTER
Pt was taking verapamil having reactions to it and you took her off and she was supposed to call back today with response so she is calling back to let you know:    Swelling and everything cleared up from that and feels good from that but   but pt came down with bladder infection and was put on nitrofurantion antibiotic for 5 days      ----- Message from Julianna Borja MA sent at 9/15/2023  2:47 PM CDT -----  Contact: pt  Type:  Patient Returning Call    Who Called:pt   Who Left Message for Patient:staff  Does the patient know what this is regarding?:yes   Would the patient rather a call back or a response via [facility]? Call   Best Call Back Number:[phone] (home)     Additional Information: n/a

## 2024-02-21 DIAGNOSIS — I47.10 SVT (SUPRAVENTRICULAR TACHYCARDIA): Primary | ICD-10-CM

## 2024-02-22 ENCOUNTER — OFFICE VISIT (OUTPATIENT)
Dept: CARDIOLOGY | Facility: CLINIC | Age: 89
End: 2024-02-22
Payer: MEDICARE

## 2024-02-22 ENCOUNTER — HOSPITAL ENCOUNTER (OUTPATIENT)
Dept: CARDIOLOGY | Facility: HOSPITAL | Age: 89
Discharge: HOME | End: 2024-02-22
Payer: MEDICARE

## 2024-02-22 VITALS
DIASTOLIC BLOOD PRESSURE: 80 MMHG | WEIGHT: 143.31 LBS | HEART RATE: 59 BPM | OXYGEN SATURATION: 95 % | SYSTOLIC BLOOD PRESSURE: 140 MMHG | BODY MASS INDEX: 27.08 KG/M2

## 2024-02-22 DIAGNOSIS — R55 NEAR SYNCOPE: ICD-10-CM

## 2024-02-22 DIAGNOSIS — I47.10 SVT (SUPRAVENTRICULAR TACHYCARDIA): ICD-10-CM

## 2024-02-22 DIAGNOSIS — I10 PRIMARY HYPERTENSION: Primary | ICD-10-CM

## 2024-02-22 DIAGNOSIS — I44.0 FIRST DEGREE AV BLOCK: ICD-10-CM

## 2024-02-22 DIAGNOSIS — I49.9 CARDIAC ARRHYTHMIA, UNSPECIFIED CARDIAC ARRHYTHMIA TYPE: ICD-10-CM

## 2024-02-22 PROCEDURE — 99213 OFFICE O/P EST LOW 20 MIN: CPT | Mod: PBBFAC,25 | Performed by: NURSE PRACTITIONER

## 2024-02-22 PROCEDURE — 93010 ELECTROCARDIOGRAM REPORT: CPT | Mod: ,,, | Performed by: INTERNAL MEDICINE

## 2024-02-22 PROCEDURE — 99999 PR PBB SHADOW E&M-EST. PATIENT-LVL III: CPT | Mod: PBBFAC,,, | Performed by: NURSE PRACTITIONER

## 2024-02-22 PROCEDURE — 93005 ELECTROCARDIOGRAM TRACING: CPT

## 2024-02-22 PROCEDURE — 99214 OFFICE O/P EST MOD 30 MIN: CPT | Mod: S$PBB,,, | Performed by: NURSE PRACTITIONER

## 2024-02-22 RX ORDER — HYDROCHLOROTHIAZIDE 50 MG/1
50 TABLET ORAL DAILY
Qty: 90 TABLET | Refills: 3 | Status: SHIPPED | OUTPATIENT
Start: 2024-02-22

## 2024-02-22 NOTE — PROGRESS NOTES
Subjective:   Patient ID:  Jayshree Keys is a 88 y.o. female who presents for evaluation of No chief complaint on file.      HPI    Jayshree Keys is a 88 year old female who presents to clinic for hospital follow up.     She was recently diagnosed with covid, near syncope. She was discharged home and returns today. She had syncopal event while sitting in her recliner at home.     EKG today reveals- SB FDAVB, HR 55    She returns today and states she is doing ok but still feels poorly.     Denies chest pain or anginal equivalents. No shortness of breath, YOUNG or palpitations. Denies orthopnea, PND or abdominal bloating. Reports regular walking without any issues lately. NO leg swelling or claudications. No recent falls, syncope or near syncopal events. Reports compliance with medications and dietary restrictions. NO CNS complaints to suggest TIA or CVA today. No signs of abnormal bleeding on ASA daily.       She does endorse daily episodes of LH since hospital discharge.       8/21/2023 update    Jayshree Keys returns for follow up.     No recurrent syncopal events.     Nuke Stress negative for ischemia  Vital connect- 11 episodes of SVT noted.     BP elevated today in office, has been running high at home recently as well.     Denies chest pain or anginal equivalents. No shortness of breath, YOUNG or palpitations. Denies orthopnea, PND or abdominal bloating. Reports regular walking without any issues lately. NO leg swelling or claudications. No recent falls, syncope or near syncopal events. Reports compliance with medications and dietary restrictions. NO CNS complaints to suggest TIA or CVA today. No signs of abnormal bleeding on ASA daily.       2/22/2024 update    She returns today for 6 month follow up.     Has been having issues with labile BP.     Not able to tolerate Verapamil due to side effects.     Doing well CV wise.     Not able to walk regularly due to joint pain of bilateral knees.     Denies chest pain or  anginal equivalents. No shortness of breath, YOUNG or palpitations. Denies orthopnea, PND or abdominal bloating. Reports regular walking without any issues lately. NO leg swelling or claudications. No recent falls, syncope or near syncopal events. Reports compliance with medications and dietary restrictions. NO CNS complaints to suggest TIA or CVA today. No signs of abnormal bleeding on ASA daily.       Past Medical History:   Diagnosis Date    Arthritis     Hypertension        Past Surgical History:   Procedure Laterality Date    APPENDECTOMY      COLPOSCOPY, WITH BIOPSY OF CERVIX      EYE SURGERY         Social History     Tobacco Use    Smoking status: Never    Smokeless tobacco: Never       Family History   Problem Relation Age of Onset    Heart disease Brother        Wt Readings from Last 3 Encounters:   02/22/24 65 kg (143 lb 4.8 oz)   08/21/23 66.7 kg (147 lb 0.8 oz)   07/06/23 64.2 kg (141 lb 8.6 oz)     Temp Readings from Last 3 Encounters:   06/29/23 100.3 °F (37.9 °C) (Oral)   08/25/21 97.3 °F (36.3 °C)     BP Readings from Last 3 Encounters:   02/22/24 (!) 140/80   08/21/23 (!) 160/90   07/06/23 (!) 150/90     Pulse Readings from Last 3 Encounters:   02/22/24 (!) 59   08/21/23 89   07/06/23 63       Current Outpatient Medications on File Prior to Visit   Medication Sig Dispense Refill    aspirin (ECOTRIN) 81 MG EC tablet Take 81 mg by mouth.      carvediloL (COREG) 25 MG tablet  180 tablet 3    fluticasone propionate (FLOVENT DISKUS) 50 mcg/actuation DsDv Inhale into the lungs. Controller      telmisartan (MICARDIS) 40 MG Tab Take 40 mg by mouth.      vitamin D (VITAMIN D3) 1000 units Tab Take 1,000 Units by mouth once daily.      [DISCONTINUED] hydroCHLOROthiazide (HYDRODIURIL) 12.5 MG Tab Take 25 mg by mouth once daily.      [DISCONTINUED] verapamiL (VERELAN) 120 MG C24P Take 1 capsule (120 mg total) by mouth every evening. (Patient not taking: Reported on 2/22/2024) 90 capsule 3     Current  Facility-Administered Medications on File Prior to Visit   Medication Dose Route Frequency Provider Last Rate Last Admin    acetaminophen tablet 650 mg  650 mg Oral Once Babak Lewis MD        albuterol inhaler 2 puff  2 puff Inhalation Q20 Min Babak Lewis MD        diphenhydrAMINE injection 25 mg  25 mg Intravenous Once Babak Lewis MD        EPINEPHrine (EPIPEN) 0.3 mg/0.3 mL pen injection 0.3 mg  0.3 mg Intramuscular PRN Babak Belcher MD        methylPREDNISolone sodium succinate injection 40 mg  40 mg Intravenous Once PRN Babak Belcher MD        ondansetron disintegrating tablet 4 mg  4 mg Oral Once PRBabak Sapp MD        sodium chloride 0.9% 500 mL flush bag   Intravenous PRBabak Sapp MD        sodium chloride 0.9% flush 10 mL  10 mL Intravenous Babak Lewis MD           Cardiac Monitor - 3-15 Day Adult (Cupid Only)    Result Date: 8/15/2023    The predominant rhythm is sinus.    Heart rates varied between 54 and 91 BPM with an average of 69 BPM.    Patient Reported Event #1 There were no patient reported events.    There were 11 runs and lasting to 8 beats. The rate varied between and   144 bpm.    The patient was asymptomatic with SVT.    The patient was monitored for a total of 13d 23h, underlying rhythm is   Sinus.  The minimum heart rate was 54 bpm; the maximum 91 bpm; the average 69 bpm.  0 % of Atrial fibrillation/Atrial flutter with longest episode of 0 ms.  -- AV block with 0 %  There were 0 pauses, the longest pause was 0 ms at --.  0 episodes of VT were found with Longest VT at 0 s .  11 supraventricular episodes were found. Longest SVT Episode 8 beats,   Fastest  bpm  There were a total of 0 PVCs with 0 morphologies and 0 couplets. Overall   PVC [place] at 0 %  There were a total of 960 PSVCs with 1 morphologies and 35 couplets.   Overall PSVC [place] at 0.07 %  There is a total of 0 patient events.         Results for  orders placed during the hospital encounter of 06/28/23    Echo    Interpretation Summary  · The left ventricle is normal in size with normal systolic function.  · The estimated ejection fraction is 65%.  · Indeterminate left ventricular diastolic function.  · Normal right ventricular size with normal right ventricular systolic function.  · Normal central venous pressure (3 mmHg).  · The estimated PA systolic pressure is 26 mmHg.    No results found for this or any previous visit.        Results for orders placed or performed during the hospital encounter of 07/06/23   SCHEDULED EKG 12-LEAD (to Muse)    Collection Time: 07/06/23  3:39 PM    Narrative    Test Reason : I48.0,    Vent. Rate : 055 BPM     Atrial Rate : 055 BPM     P-R Int : 256 ms          QRS Dur : 104 ms      QT Int : 466 ms       P-R-T Axes : 050 -57 096 degrees     QTc Int : 445 ms    Sinus bradycardia with 1st degree A-V block  Left axis deviation  LVH with repolarization abnormality ( R in aVL , [place] product )  Inferior infarct (cited on or before 28-JUN-2023)  Anterolateral infarct (cited on or before 28-JUN-2023)  Abnormal ECG  When compared with ECG of 28-JUN-2023 13:02,  Serial changes of Anterior infarct Present  Confirmed by Nitza Perkins MD ([id number]) on 7/7/2023 4:47:49 AM    Referred By: MALU ESCAMILLA           Confirmed By:Nitza Perkins MD         Review of Systems   Constitutional: Positive for malaise/fatigue.   HENT:  Negative for hearing loss and hoarse voice.    Eyes:  Negative for blurred vision and visual disturbance.   Cardiovascular:  Negative for chest pain, claudication, dyspnea on exertion, irregular heartbeat, leg swelling, near-syncope, orthopnea, palpitations, paroxysmal nocturnal dyspnea and syncope.   Respiratory:  Negative for cough, hemoptysis, shortness of breath, sleep disturbances due to breathing, snoring and wheezing.    Endocrine: Negative for cold intolerance and heat intolerance.   Hematologic/Lymphatic:  Does not bruise/bleed easily.   Skin:  Negative for color change, dry skin and nail changes.   Musculoskeletal:  Positive for arthritis and back pain. Negative for joint pain and myalgias.   Gastrointestinal:  Negative for bloating, abdominal pain, constipation, nausea and vomiting.   Genitourinary:  Negative for dysuria, flank pain, hematuria and hesitancy.   Neurological:  Negative for headaches, light-headedness, loss of balance, numbness, paresthesias and weakness.   Psychiatric/Behavioral:  Negative for altered mental status.    Allergic/Immunologic: Negative for environmental allergies.         Objective:BP (!) 140/80 (BP Location: Left arm, Patient Position: Sitting)   Pulse (!) 59   Wt 65 kg (143 lb 4.8 oz)   SpO2 95%   BMI 27.08 kg/m²      Physical Exam  Vitals and nursing note reviewed.   Constitutional:       General: She is not in acute distress.     Appearance: Normal appearance. She is well-developed. She is not ill-appearing.   HENT:      Head: Normocephalic and atraumatic.      Nose: Nose normal.      Mouth/Throat:      Mouth: Mucous membranes are moist.   Eyes:      Pupils: Pupils are equal, round, and reactive to light.   Neck:      Thyroid: No thyromegaly.      Vascular: No JVD.      Trachea: No tracheal deviation.   Cardiovascular:      Rate and Rhythm: Normal rate and regular rhythm.      Chest Wall: PMI is not displaced.      Pulses: Intact distal pulses.           Radial pulses are 2+ on the right side and 2+ on the left side.        Dorsalis pedis pulses are 2+ on the right side and 2+ on the left side.      Heart sounds: S1 normal and S2 normal. Heart sounds not distant. No murmur heard.     Comments: NSR FDAVB  Pulmonary:      Effort: Pulmonary effort is normal. No respiratory distress.      Breath sounds: Normal breath sounds. No wheezing.   Abdominal:      General: Bowel sounds are normal. There is no distension.      Palpations: Abdomen is soft.      Tenderness: There is no abdominal  "tenderness.   Musculoskeletal:         General: No swelling. Normal range of motion.      Cervical back: Full passive range of motion without pain, normal range of motion and neck supple.      Right lower leg: No edema.      Left lower leg: No edema.      Right ankle: No swelling.      Left ankle: No swelling.   Skin:     General: Skin is warm and dry.      Capillary Refill: Capillary refill takes less than 2 seconds.      Nails: There is no clubbing.   Neurological:      General: No focal deficit present.      Mental Status: She is alert and oriented to person, place, and time.      Motor: No weakness.   Psychiatric:         Speech: Speech normal.         Behavior: Behavior normal.         Thought Content: Thought content normal.         Judgment: Judgment normal.         No results found for: "CHOL"  No results found for: "HDL"  No results found for: "LDLCALC"  No results found for: "TRIG"  No results found for: "CHOLHDL"    Chemistry        Component Value Date/Time     06/29/2023 0509    K 4.0 06/29/2023 0509     06/29/2023 0509    CO2 23 06/29/2023 0509    BUN 13 06/29/2023 0509    CREATININE 0.7 06/29/2023 0509     (H) 06/29/2023 0509        Component Value Date/Time    CALCIUM 9.1 06/29/2023 0509    ALKPHOS 55 06/29/2023 0509    AST 18 06/29/2023 0509    ALT 10 06/29/2023 0509    BILITOT 0.7 06/29/2023 0509          No results found for: "TSH"  No results found for: "INR", "PROTIME"  Lab Results   Component Value Date    WBC 3.75 (L) 06/29/2023    HGB 12.2 06/29/2023    HCT 35.1 (L) 06/29/2023    MCV 92 06/29/2023     06/29/2023          Assessment:      1. Primary hypertension    2. SVT (supraventricular tachycardia)    3. First degree AV block    4. Cardiac arrhythmia, unspecified cardiac arrhythmia type    5. Near syncope            Plan:     Continue Coreg, ARB, HCTZ  Dash diet 2 gm sodium restriction  Profile BP HR at home  RTC in 6 months or sooner if needed.     Sharmin Lewis " FNP-C [facility]

## 2024-02-23 LAB
OHS QRS DURATION: 108 MS
OHS QTC CALCULATION: 434 MS